# Patient Record
Sex: MALE | Race: OTHER | Employment: FULL TIME | URBAN - METROPOLITAN AREA
[De-identification: names, ages, dates, MRNs, and addresses within clinical notes are randomized per-mention and may not be internally consistent; named-entity substitution may affect disease eponyms.]

---

## 2019-07-05 ENCOUNTER — HOSPITAL ENCOUNTER (OUTPATIENT)
Age: 64
Discharge: HOME | End: 2019-07-05
Payer: COMMERCIAL

## 2019-07-05 VITALS
SYSTOLIC BLOOD PRESSURE: 139 MMHG | TEMPERATURE: 99 F | WEIGHT: 190 LBS | HEART RATE: 58 BPM | BODY MASS INDEX: 26 KG/M2 | DIASTOLIC BLOOD PRESSURE: 54 MMHG | RESPIRATION RATE: 18 BRPM | OXYGEN SATURATION: 98 %

## 2019-07-05 DIAGNOSIS — L24.89 IRRITANT CONTACT DERMATITIS DUE TO OTHER AGENTS: Primary | ICD-10-CM

## 2019-07-05 PROCEDURE — 99203 OFFICE O/P NEW LOW 30 MIN: CPT

## 2019-07-05 NOTE — ED PROVIDER NOTES
Patient presents with:  Rash Skin Problem (integumentary)      HPI:     Anshu Asher is a 58year old male who presents today with a chief complaint of rash to the anterior proximal upper arms past few days.   The patient works at a country [employer] and is file        Attends meetings of clubs or organizations: Not on file        Relationship status: Not on file      Intimate partner violence:        Fear of current or ex partner: Not on file        Emotionally abused: Not on file        Physically abused: N 30 g          Refill:  0      Labs performed this visit:  No results found for this or any previous visit (from the past 10 hour(s)). MDM:  The rash appears to be a contact dermatitis.   I will recommend taking an over-the-counter Benadryl or allergy med

## 2019-07-05 NOTE — ED INITIAL ASSESSMENT (HPI)
PATIENT ARRIVED AMBULATORY TO ROOM WITH FAMILY. +RASH TO BILATERAL UPPER ARMS. RASH WORSENS WITH SWEATING. +ITCHINESS.  NO NEW SOAPS, LOTIONS, DETERGENTS, ETC.

## 2021-03-12 DIAGNOSIS — Z23 NEED FOR VACCINATION: ICD-10-CM

## 2021-06-02 ENCOUNTER — HOSPITAL ENCOUNTER (OUTPATIENT)
Dept: GENERAL RADIOLOGY | Facility: HOSPITAL | Age: 66
Discharge: HOME | End: 2021-06-02
Attending: INTERNAL MEDICINE
Payer: COMMERCIAL

## 2021-06-02 ENCOUNTER — OFFICE VISIT (OUTPATIENT)
Dept: FAMILY MEDICINE CLINIC | Facility: CLINIC | Age: 66
End: 2021-06-02
Payer: COMMERCIAL

## 2021-06-02 VITALS
WEIGHT: 192 LBS | HEIGHT: 65 IN | DIASTOLIC BLOOD PRESSURE: 64 MMHG | BODY MASS INDEX: 31.99 KG/M2 | HEART RATE: 70 BPM | SYSTOLIC BLOOD PRESSURE: 150 MMHG | OXYGEN SATURATION: 98 %

## 2021-06-02 DIAGNOSIS — Z76.89 ESTABLISHING CARE WITH NEW DOCTOR, ENCOUNTER FOR: ICD-10-CM

## 2021-06-02 DIAGNOSIS — Z76.89 ESTABLISHING CARE WITH NEW DOCTOR, ENCOUNTER FOR: Primary | ICD-10-CM

## 2021-06-02 DIAGNOSIS — M17.0 PRIMARY OSTEOARTHRITIS OF BOTH KNEES: ICD-10-CM

## 2021-06-02 DIAGNOSIS — Z11.59 NEED FOR HEPATITIS C SCREENING TEST: ICD-10-CM

## 2021-06-02 PROCEDURE — 3008F BODY MASS INDEX DOCD: CPT | Performed by: INTERNAL MEDICINE

## 2021-06-02 PROCEDURE — 73564 X-RAY EXAM KNEE 4 OR MORE: CPT | Performed by: INTERNAL MEDICINE

## 2021-06-02 PROCEDURE — 3077F SYST BP >= 140 MM HG: CPT | Performed by: INTERNAL MEDICINE

## 2021-06-02 PROCEDURE — 99204 OFFICE O/P NEW MOD 45 MIN: CPT | Performed by: INTERNAL MEDICINE

## 2021-06-02 PROCEDURE — 3078F DIAST BP <80 MM HG: CPT | Performed by: INTERNAL MEDICINE

## 2021-06-02 RX ORDER — NAPROXEN 500 MG/1
500 TABLET ORAL 2 TIMES DAILY WITH MEALS
Qty: 60 TABLET | Refills: 0 | Status: SHIPPED | OUTPATIENT
Start: 2021-06-02 | End: 2021-08-24

## 2021-06-03 NOTE — PROGRESS NOTES
[facility]  New Patient History and Physical      HPI:   Patient presents with:  Establish Care  Knee Pain: bilateral      Shahid Delgadillo is a 76year old male presenting for:  Establishment of care. Oneal Claude   Has  has a past medical hist hypertension          PSH:  History reviewed. No pertinent surgical history.     Allergies:  No Known Allergies   Social History:  Social History    Tobacco Use      Smoking status: Former Smoker        Quit date: 1994        Years since quittin.4 Ht 5' 5\" (1.651 m)   Wt 192 lb (87.1 kg)   SpO2 98%   BMI 31.95 kg/m²  Estimated body mass index is 31.95 kg/m² as calculated from the following:    Height as of this encounter: 5' 5\" (1.651 m). Weight as of this encounter: 192 lb (87.1 kg).    Wt Read presents primarily presents for:    (Z76.89) Establishing care with new doctor, encounter for  (primary encounter diagnosis)  Plan: LIPID PANEL, CBC, PLATELET; NO DIFFERENTIAL,         COMP METABOLIC PANEL (14), PSA, TOTAL WITH         REFLEX TO PSA, FREE, includes: Preparation to see patient including chart review, reviewing appropriate medical history, counseling and education (diet and exercise), discussing treatment options, ordering appropriate diagnostic tests and documentation.       Dominik Guadarrama MD

## 2021-06-08 ENCOUNTER — TELEPHONE (OUTPATIENT)
Dept: FAMILY MEDICINE CLINIC | Facility: CLINIC | Age: 66
End: 2021-06-08

## 2021-06-08 NOTE — TELEPHONE ENCOUNTER
TCB      I am unable to obtain referral on this member as their pcp states the following: Change of PCP to [facility] will need to be changed in order to process.   Pleas have the patient call their plan to change and we can move forward.      ----- Message

## 2021-06-11 ENCOUNTER — NURSE ONLY (OUTPATIENT)
Dept: FAMILY MEDICINE CLINIC | Facility: CLINIC | Age: 66
End: 2021-06-11
Payer: COMMERCIAL

## 2021-06-16 ENCOUNTER — TELEPHONE (OUTPATIENT)
Dept: FAMILY MEDICINE CLINIC | Facility: CLINIC | Age: 66
End: 2021-06-16

## 2021-06-16 DIAGNOSIS — Z00.00 PREVENTATIVE HEALTH CARE: Primary | ICD-10-CM

## 2021-06-16 NOTE — TELEPHONE ENCOUNTER
I attempted to call patient with no answer. Please inform him that all his labs returned. No sign abnormality. Lipid panel overall well controlled. Will plan for repeat labs in 6 months.       Orders placed      Jono Cruz MD

## 2021-06-21 ENCOUNTER — TELEPHONE (OUTPATIENT)
Dept: FAMILY MEDICINE CLINIC | Facility: CLINIC | Age: 66
End: 2021-06-21

## 2021-06-21 NOTE — TELEPHONE ENCOUNTER
Wife is calling for patients XR results. States Dr. Brittany Caba called her  on Saturday but he wasn't able to call back.

## 2021-06-22 NOTE — TELEPHONE ENCOUNTER
Results were discussed with his wife and verbalized understanding, she states his insurance will be changing and no need to get a referral for now to see Dr Jordan Gant, they will call once his insurance becomes active.

## 2021-07-26 ENCOUNTER — TELEPHONE (OUTPATIENT)
Dept: FAMILY MEDICINE CLINIC | Facility: CLINIC | Age: 66
End: 2021-07-26

## 2021-07-26 DIAGNOSIS — M17.0 PRIMARY OSTEOARTHRITIS OF BOTH KNEES: Primary | ICD-10-CM

## 2021-07-26 NOTE — TELEPHONE ENCOUNTER
Per Dr Nikita Aguilera he did not remember calling patient foe any kind of results, I asked patient's wife about the message and claims he called them to discuss xray results, per our records last time MD called him was last month and results were already discuss

## 2021-07-26 NOTE — TELEPHONE ENCOUNTER
pts wife called stating that pt received a call on Friday by Dr Marcos Balderrama but since he was working he was unable to   Wife requested for doctor to call her instead

## 2021-08-24 ENCOUNTER — HOSPITAL ENCOUNTER (OUTPATIENT)
Dept: GENERAL RADIOLOGY | Age: 66
Discharge: HOME | End: 2021-08-24
Attending: PHYSICAL MEDICINE & REHABILITATION
Payer: COMMERCIAL

## 2021-08-24 ENCOUNTER — OFFICE VISIT (OUTPATIENT)
Dept: NEUROLOGY | Facility: CLINIC | Age: 66
End: 2021-08-24

## 2021-08-24 VITALS
BODY MASS INDEX: 32.46 KG/M2 | OXYGEN SATURATION: 97 % | HEART RATE: 61 BPM | WEIGHT: 194.81 LBS | HEIGHT: 65 IN | DIASTOLIC BLOOD PRESSURE: 76 MMHG | SYSTOLIC BLOOD PRESSURE: 148 MMHG

## 2021-08-24 DIAGNOSIS — R29.898 WEAKNESS OF FOOT, UNSPECIFIED LATERALITY: ICD-10-CM

## 2021-08-24 DIAGNOSIS — M48.061 LUMBAR FORAMINAL STENOSIS: ICD-10-CM

## 2021-08-24 DIAGNOSIS — M79.604 BILATERAL LEG PAIN: Primary | ICD-10-CM

## 2021-08-24 DIAGNOSIS — M54.16 LUMBAR RADICULOPATHY: ICD-10-CM

## 2021-08-24 DIAGNOSIS — M79.604 BILATERAL LEG PAIN: ICD-10-CM

## 2021-08-24 DIAGNOSIS — M79.10 MYALGIA: ICD-10-CM

## 2021-08-24 DIAGNOSIS — M79.605 BILATERAL LEG PAIN: ICD-10-CM

## 2021-08-24 DIAGNOSIS — M79.605 BILATERAL LEG PAIN: Primary | ICD-10-CM

## 2021-08-24 PROCEDURE — 72110 X-RAY EXAM L-2 SPINE 4/>VWS: CPT | Performed by: PHYSICAL MEDICINE & REHABILITATION

## 2021-08-24 PROCEDURE — 99244 OFF/OP CNSLTJ NEW/EST MOD 40: CPT | Performed by: PHYSICAL MEDICINE & REHABILITATION

## 2021-08-24 PROCEDURE — 3077F SYST BP >= 140 MM HG: CPT | Performed by: PHYSICAL MEDICINE & REHABILITATION

## 2021-08-24 PROCEDURE — 3078F DIAST BP <80 MM HG: CPT | Performed by: PHYSICAL MEDICINE & REHABILITATION

## 2021-08-24 PROCEDURE — 3008F BODY MASS INDEX DOCD: CPT | Performed by: PHYSICAL MEDICINE & REHABILITATION

## 2021-08-24 RX ORDER — GABAPENTIN 100 MG/1
100 CAPSULE ORAL 3 TIMES DAILY
Qty: 90 CAPSULE | Refills: 0 | Status: SHIPPED | OUTPATIENT
Start: 2021-08-24 | End: 2021-09-24

## 2021-08-24 NOTE — PATIENT INSTRUCTIONS
1) Get Xr of the lumbar spine today on your way out  2) Begin formal physical therapy at the [facility]  3) Start gabapentin 100 mg three times per day. If limited improvement, then would increase to 200 mg three times per day.   4) Follow up with me

## 2021-08-24 NOTE — H&P
[address] H&P    Requesting Physician: Barbara Mora MD    Chief Complaint (Reason for Visit):  Patient presents with:  New Patient: Bilateral leg pain. left worse than right.  Pain starts from t Diagnosis Date   • Unspecified essential hypertension        PAST SURGICAL HISTORY:   History reviewed. No pertinent surgical history.      FAMILY HISTORY:   Family History   Problem Relation Age of Onset   • No Known Problems Daughter    • No Known Probl oriented x 3, attentive, able to follow 2 step commands, comprehention intact, spontaneous speech intact  Motor:    Musculoskeletal:    LUMBAR SPINE:  Inspection: no erythema, swelling, or obvious deformity.   Their iliac crest and shoulder heights are symm 06/11/2021 25  7 - 25 mg/dL Final   • CREATININE 06/11/2021 1.03  0.70 - 1.25 mg/dL Final   • eGFR NON-AFR.  AMERICAN 06/11/2021 75  > OR = 60 mL/min/1.73m2 Final   • eGFR  06/11/2021 87  > OR = 60 mL/min/1.73m2 Final   • BUN/CREATININE RATI OTHER: Negative. Impression: CONCLUSION:   1. 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            Dictated by (CST): Stevenson Addison MD on 6/02/2021 at 6:04 PM       Finalized b symptoms increase more so when he has a couple of cocktails over the weekend. On the differential is alcohol induced neuropathy although less likely as Jesus does not consume much alcohol.        RTC in 6 to 8 weeks    Discharge Instructions were provid

## 2021-08-27 ENCOUNTER — TELEPHONE (OUTPATIENT)
Dept: NEUROLOGY | Facility: CLINIC | Age: 66
End: 2021-08-27

## 2021-08-27 NOTE — TELEPHONE ENCOUNTER
. Gayla Petty called to receive results of  Giuseppe Romano. ARABELLA verified. I discussed the X-ray results and recommended to continue plan of care including gabapentin, physical therapy, and follow up in 6 weeks. Guy Cha.  Jordan Gant MD, [facility] [place]

## 2021-09-10 ENCOUNTER — TELEPHONE (OUTPATIENT)
Dept: PHYSICAL THERAPY | Facility: HOSPITAL | Age: 66
End: 2021-09-10

## 2021-09-14 ENCOUNTER — OFFICE VISIT (OUTPATIENT)
Dept: PHYSICAL THERAPY | Facility: HOSPITAL | Age: 66
End: 2021-09-14
Attending: PHYSICAL MEDICINE & REHABILITATION
Payer: COMMERCIAL

## 2021-09-14 DIAGNOSIS — M79.10 MYALGIA: ICD-10-CM

## 2021-09-14 DIAGNOSIS — M54.16 LUMBAR RADICULOPATHY: ICD-10-CM

## 2021-09-14 DIAGNOSIS — M48.061 LUMBAR FORAMINAL STENOSIS: ICD-10-CM

## 2021-09-14 DIAGNOSIS — M79.605 BILATERAL LEG PAIN: ICD-10-CM

## 2021-09-14 DIAGNOSIS — M79.604 BILATERAL LEG PAIN: ICD-10-CM

## 2021-09-14 DIAGNOSIS — R29.898 WEAKNESS OF FOOT, UNSPECIFIED LATERALITY: ICD-10-CM

## 2021-09-14 PROCEDURE — 97161 PT EVAL LOW COMPLEX 20 MIN: CPT | Performed by: PHYSICAL THERAPIST

## 2021-09-14 PROCEDURE — 97110 THERAPEUTIC EXERCISES: CPT | Performed by: PHYSICAL THERAPIST

## 2021-09-14 NOTE — PROGRESS NOTES
9/16/2021  Dx:     Bilateral leg pain (M79.604,M79.605)  Lumbar radiculopathy (M54.16)  Lumbar foraminal stenosis (M48.061)  Myalgia (M79.10)  Weakness of foot, unspecified laterality (O16.158)        Authorized # of Visits:  8 visits on HMO         Next M without an increase in pain.     Frequency / Duration: Patient will be seen for 1-2 x/week or a total of 8 visits over a 90 day period. Treatment will include: Manual Therapy; Therapeutic Exercises; Neuromuscular Re-education; Therapeutic Activity;   Patie

## 2021-09-16 ENCOUNTER — OFFICE VISIT (OUTPATIENT)
Dept: PHYSICAL THERAPY | Facility: HOSPITAL | Age: 66
End: 2021-09-16
Attending: PHYSICAL MEDICINE & REHABILITATION
Payer: COMMERCIAL

## 2021-09-16 DIAGNOSIS — M79.10 MYALGIA: ICD-10-CM

## 2021-09-16 DIAGNOSIS — M48.061 LUMBAR FORAMINAL STENOSIS: ICD-10-CM

## 2021-09-16 DIAGNOSIS — R29.898 WEAKNESS OF FOOT, UNSPECIFIED LATERALITY: ICD-10-CM

## 2021-09-16 DIAGNOSIS — M79.605 BILATERAL LEG PAIN: ICD-10-CM

## 2021-09-16 DIAGNOSIS — M79.604 BILATERAL LEG PAIN: ICD-10-CM

## 2021-09-16 DIAGNOSIS — M54.16 LUMBAR RADICULOPATHY: ICD-10-CM

## 2021-09-16 PROCEDURE — 97110 THERAPEUTIC EXERCISES: CPT | Performed by: PHYSICAL THERAPIST

## 2021-09-16 NOTE — PROGRESS NOTES
LUMBAR SPINE EVALUATION:   Referring Physician: Dr. Ebony Alexander  Diagnosis:    M79.604, M79.605 (ICD-10-CM) - 729.5 (ICD-9-CM) - Bilateral leg pain    M54.16 (ICD-10-CM) - 724.4 (ICD-9-CM) - Lumbar radiculopathy    M48.061 (ICD-10-CM) - 724.02 (ICD-9-CM) - Fallon Arciniega most pain is in his calf and does report some weakness with heel walking and toe walking.   He also displays decreased strength with MMT's  He displays very limited flexibility with throughout his trunk with poor ability to exhale with decreased IO/TA recru the plan of care, purpose and individual goals for therapy, precautions for therapy. All questions were answered. Charges: PT Eval x 1, TE2    Total Timed treatment: 45 min      Total Treatment Time: 45 min         PLAN OF CARE:    Goals:       The pt

## 2021-09-23 DIAGNOSIS — R29.898 WEAKNESS OF FOOT, UNSPECIFIED LATERALITY: ICD-10-CM

## 2021-09-23 DIAGNOSIS — M79.605 BILATERAL LEG PAIN: ICD-10-CM

## 2021-09-23 DIAGNOSIS — M79.10 MYALGIA: ICD-10-CM

## 2021-09-23 DIAGNOSIS — M79.604 BILATERAL LEG PAIN: ICD-10-CM

## 2021-09-23 DIAGNOSIS — M54.16 LUMBAR RADICULOPATHY: ICD-10-CM

## 2021-09-23 DIAGNOSIS — M48.061 LUMBAR FORAMINAL STENOSIS: ICD-10-CM

## 2021-09-24 RX ORDER — GABAPENTIN 100 MG/1
CAPSULE ORAL
Qty: 90 CAPSULE | Refills: 0 | Status: SHIPPED | OUTPATIENT
Start: 2021-09-24

## 2021-09-24 NOTE — TELEPHONE ENCOUNTER
Medication request: gabapentin 100 MG Oral Cap  Sig:   Take 1 capsule (100 mg total) by mouth 3 (three) times daily. LOV: 8/24/21  NOV: 10/26/21      [place] 8/24/21 qty#90 r-0    Patient states he's feeling great on rx.   Patient states 0/10

## 2021-09-28 ENCOUNTER — OFFICE VISIT (OUTPATIENT)
Dept: PHYSICAL THERAPY | Facility: HOSPITAL | Age: 66
End: 2021-09-28
Attending: PHYSICAL MEDICINE & REHABILITATION
Payer: COMMERCIAL

## 2021-09-28 PROCEDURE — 97110 THERAPEUTIC EXERCISES: CPT | Performed by: PHYSICAL THERAPIST

## 2021-09-28 NOTE — PROGRESS NOTES
9/28/2021  Dx:     Bilateral leg pain (M79.604,M79.605)  Lumbar radiculopathy (M54.16)  Lumbar foraminal stenosis (M48.061)  Myalgia (M79.10)  Weakness of foot, unspecified laterality (L98.554)        Authorized # of Visits:  8 visits on HMO         Next M will be able to perform sit to stand without an increase in pain. 5.  The pt will be able to perform a full work day without an increase in pain.     Frequency / Duration: Patient will be seen for 1-2 x/week or a total of 8 visits over a 90 day period.   Joaquim Badillo

## 2021-10-01 ENCOUNTER — TELEPHONE (OUTPATIENT)
Dept: PHYSICAL THERAPY | Facility: HOSPITAL | Age: 66
End: 2021-10-01

## 2021-10-01 ENCOUNTER — APPOINTMENT (OUTPATIENT)
Dept: PHYSICAL THERAPY | Facility: HOSPITAL | Age: 66
End: 2021-10-01
Attending: PHYSICAL MEDICINE & REHABILITATION
Payer: COMMERCIAL

## 2021-10-05 ENCOUNTER — APPOINTMENT (OUTPATIENT)
Dept: PHYSICAL THERAPY | Facility: HOSPITAL | Age: 66
End: 2021-10-05
Attending: PHYSICAL MEDICINE & REHABILITATION
Payer: COMMERCIAL

## 2021-10-14 ENCOUNTER — TELEPHONE (OUTPATIENT)
Dept: PHYSICAL THERAPY | Facility: HOSPITAL | Age: 66
End: 2021-10-14

## 2021-10-14 NOTE — TELEPHONE ENCOUNTER
Left message offering the 3:45 pm PT appointment for today.   Given scheduling line to return the call

## 2021-10-14 NOTE — TELEPHONE ENCOUNTER
Spoke with the patient's wife who stated he can't come to therapy this week because he is working late. States he does not work in the winter and will have more availability.   The wife stated the patient was doing his HEP daily when he gets home from work

## 2021-11-17 DIAGNOSIS — Z12.11 ENCOUNTER FOR FIT (FECAL IMMUNOCHEMICAL TEST) SCREENING: ICD-10-CM

## 2021-11-17 DIAGNOSIS — Z12.11 ENCOUNTER FOR SCREENING COLONOSCOPY: Primary | ICD-10-CM

## 2021-11-17 NOTE — PROGRESS NOTES
Please call to remind of healthcare maintenance. A colonoscopy is a simple and effective way to screen for colon cancer and remove suspicious polyps that can become cancerous. This helps to prevent the development of colorectal cancer.   If present, d

## 2022-01-01 ENCOUNTER — APPOINTMENT (OUTPATIENT)
Dept: MRI IMAGING | Facility: HOSPITAL | Age: 67
End: 2022-01-01
Attending: Other
Payer: COMMERCIAL

## 2022-01-01 ENCOUNTER — APPOINTMENT (OUTPATIENT)
Dept: GENERAL RADIOLOGY | Facility: HOSPITAL | Age: 67
End: 2022-01-01
Payer: COMMERCIAL

## 2022-01-01 ENCOUNTER — APPOINTMENT (OUTPATIENT)
Dept: GENERAL RADIOLOGY | Facility: HOSPITAL | Age: 67
End: 2022-01-01
Attending: HOSPITALIST
Payer: COMMERCIAL

## 2022-01-01 ENCOUNTER — APPOINTMENT (OUTPATIENT)
Dept: INTERVENTIONAL RADIOLOGY/VASCULAR | Facility: HOSPITAL | Age: 67
End: 2022-01-01
Attending: RADIOLOGY
Payer: COMMERCIAL

## 2022-01-01 ENCOUNTER — HOSPITAL ENCOUNTER (INPATIENT)
Facility: HOSPITAL | Age: 67
LOS: 3 days | End: 2022-01-01
Attending: INTERNAL MEDICINE | Admitting: HOSPITALIST
Payer: COMMERCIAL

## 2022-01-01 ENCOUNTER — APPOINTMENT (OUTPATIENT)
Dept: CT IMAGING | Facility: HOSPITAL | Age: 67
End: 2022-01-01
Attending: Other
Payer: COMMERCIAL

## 2022-01-01 ENCOUNTER — APPOINTMENT (OUTPATIENT)
Dept: CV DIAGNOSTICS | Facility: HOSPITAL | Age: 67
End: 2022-01-01
Attending: Other
Payer: COMMERCIAL

## 2022-01-01 ENCOUNTER — APPOINTMENT (OUTPATIENT)
Dept: CT IMAGING | Facility: HOSPITAL | Age: 67
End: 2022-01-01
Attending: INTERNAL MEDICINE
Payer: COMMERCIAL

## 2022-01-01 ENCOUNTER — APPOINTMENT (OUTPATIENT)
Dept: CT IMAGING | Facility: HOSPITAL | Age: 67
End: 2022-01-01
Attending: EMERGENCY MEDICINE
Payer: COMMERCIAL

## 2022-01-01 ENCOUNTER — APPOINTMENT (OUTPATIENT)
Dept: CV DIAGNOSTICS | Facility: HOSPITAL | Age: 67
End: 2022-01-01
Attending: INTERNAL MEDICINE
Payer: COMMERCIAL

## 2022-01-01 ENCOUNTER — APPOINTMENT (OUTPATIENT)
Dept: CT IMAGING | Facility: HOSPITAL | Age: 67
End: 2022-01-01
Payer: COMMERCIAL

## 2022-01-01 ENCOUNTER — HOSPITAL ENCOUNTER (INPATIENT)
Facility: HOSPITAL | Age: 67
LOS: 1 days | Discharge: ANOTHER HOSPITAL | End: 2022-01-01
Attending: EMERGENCY MEDICINE | Admitting: HOSPITALIST
Payer: COMMERCIAL

## 2022-01-01 VITALS
WEIGHT: 150 LBS | SYSTOLIC BLOOD PRESSURE: 174 MMHG | BODY MASS INDEX: 27.6 KG/M2 | DIASTOLIC BLOOD PRESSURE: 67 MMHG | RESPIRATION RATE: 28 BRPM | TEMPERATURE: 99 F | OXYGEN SATURATION: 100 % | HEART RATE: 89 BPM | HEIGHT: 62 IN

## 2022-01-01 VITALS
WEIGHT: 187.19 LBS | RESPIRATION RATE: 16 BRPM | OXYGEN SATURATION: 100 % | BODY MASS INDEX: 34 KG/M2 | TEMPERATURE: 98 F | HEART RATE: 70 BPM | DIASTOLIC BLOOD PRESSURE: 68 MMHG | SYSTOLIC BLOOD PRESSURE: 142 MMHG

## 2022-01-01 DIAGNOSIS — R29.90 NEUROLOGICAL DYSFUNCTION: ICD-10-CM

## 2022-01-01 DIAGNOSIS — I63.9 CEREBROVASCULAR ACCIDENT (CVA), UNSPECIFIED MECHANISM (HCC): Primary | ICD-10-CM

## 2022-01-01 LAB
ALBUMIN SERPL-MCNC: 2.2 G/DL (ref 3.4–5)
ALBUMIN SERPL-MCNC: 2.2 G/DL (ref 3.4–5)
ALBUMIN SERPL-MCNC: 2.4 G/DL (ref 3.4–5)
ALBUMIN SERPL-MCNC: 2.4 G/DL (ref 3.4–5)
ALBUMIN SERPL-MCNC: 2.5 G/DL (ref 3.4–5)
ALBUMIN/GLOB SERPL: 0.5 {RATIO} (ref 1–2)
ALBUMIN/GLOB SERPL: 0.6 {RATIO} (ref 1–2)
ALP LIVER SERPL-CCNC: 58 U/L
ALP LIVER SERPL-CCNC: 65 U/L
ALP LIVER SERPL-CCNC: 69 U/L
ALP LIVER SERPL-CCNC: 71 U/L
ALP LIVER SERPL-CCNC: 78 U/L
ALT SERPL-CCNC: 25 U/L
ALT SERPL-CCNC: 26 U/L
ALT SERPL-CCNC: 26 U/L
ALT SERPL-CCNC: 30 U/L
ALT SERPL-CCNC: 32 U/L
AMYLASE SERPL-CCNC: 110 U/L (ref 25–115)
AMYLASE SERPL-CCNC: 119 U/L (ref 25–115)
AMYLASE SERPL-CCNC: 137 U/L (ref 25–115)
AMYLASE SERPL-CCNC: 169 U/L (ref 25–115)
AMYLASE SERPL-CCNC: 182 U/L (ref 25–115)
ANION GAP SERPL CALC-SCNC: 2 MMOL/L (ref 0–18)
ANION GAP SERPL CALC-SCNC: 3 MMOL/L (ref 0–18)
ANION GAP SERPL CALC-SCNC: 3 MMOL/L (ref 0–18)
ANION GAP SERPL CALC-SCNC: 4 MMOL/L (ref 0–18)
ANION GAP SERPL CALC-SCNC: 4 MMOL/L (ref 0–18)
ANION GAP SERPL CALC-SCNC: 5 MMOL/L (ref 0–18)
ANION GAP SERPL CALC-SCNC: 6 MMOL/L (ref 0–18)
ANION GAP SERPL CALC-SCNC: 7 MMOL/L (ref 0–18)
ANTIBODY SCREEN: NEGATIVE
ANTIBODY SCREEN: NEGATIVE
APTT PPP: 25.7 SECONDS (ref 23.3–35.6)
APTT PPP: 26.8 SECONDS (ref 23.3–35.6)
APTT PPP: 27 SECONDS (ref 23.3–35.6)
APTT PPP: 27.5 SECONDS (ref 23.3–35.6)
APTT PPP: 30 SECONDS (ref 23.3–35.6)
AST SERPL-CCNC: 19 U/L (ref 15–37)
AST SERPL-CCNC: 22 U/L (ref 15–37)
AST SERPL-CCNC: 24 U/L (ref 15–37)
AST SERPL-CCNC: 28 U/L (ref 15–37)
AST SERPL-CCNC: 28 U/L (ref 15–37)
ATRIAL RATE: 56 BPM
BASE EXCESS BLD CALC-SCNC: -1.5 MMOL/L (ref ?–2)
BASE EXCESS BLDA CALC-SCNC: -0.8 MMOL/L (ref ?–2)
BASE EXCESS BLDA CALC-SCNC: -0.8 MMOL/L (ref ?–2)
BASE EXCESS BLDA CALC-SCNC: -2.6 MMOL/L (ref ?–2)
BASE EXCESS BLDA CALC-SCNC: -2.7 MMOL/L (ref ?–2)
BASE EXCESS BLDA CALC-SCNC: -3.4 MMOL/L (ref ?–2)
BASE EXCESS BLDA CALC-SCNC: -3.5 MMOL/L (ref ?–2)
BASE EXCESS BLDA CALC-SCNC: -3.5 MMOL/L (ref ?–2)
BASE EXCESS BLDA CALC-SCNC: -3.9 MMOL/L (ref ?–2)
BASE EXCESS BLDA CALC-SCNC: -4 MMOL/L (ref ?–2)
BASE EXCESS BLDA CALC-SCNC: -4.5 MMOL/L (ref ?–2)
BASE EXCESS BLDA CALC-SCNC: -5.6 MMOL/L (ref ?–2)
BASOPHILS # BLD AUTO: 0 X10(3) UL (ref 0–0.2)
BASOPHILS # BLD AUTO: 0 X10(3) UL (ref 0–0.2)
BASOPHILS # BLD AUTO: 0.01 X10(3) UL (ref 0–0.2)
BASOPHILS # BLD AUTO: 0.01 X10(3) UL (ref 0–0.2)
BASOPHILS # BLD AUTO: 0.02 X10(3) UL (ref 0–0.2)
BASOPHILS # BLD AUTO: 0.02 X10(3) UL (ref 0–0.2)
BASOPHILS # BLD AUTO: 0.03 X10(3) UL (ref 0–0.2)
BASOPHILS NFR BLD AUTO: 0 %
BASOPHILS NFR BLD AUTO: 0 %
BASOPHILS NFR BLD AUTO: 0.1 %
BASOPHILS NFR BLD AUTO: 0.1 %
BASOPHILS NFR BLD AUTO: 0.2 %
BILIRUB DIRECT SERPL-MCNC: 0.3 MG/DL (ref 0–0.2)
BILIRUB DIRECT SERPL-MCNC: 0.4 MG/DL (ref 0–0.2)
BILIRUB SERPL-MCNC: 0.8 MG/DL (ref 0.1–2)
BILIRUB SERPL-MCNC: 1 MG/DL (ref 0.1–2)
BILIRUB UR QL STRIP.AUTO: NEGATIVE
BLOOD TYPE BARCODE: 7300
BLOOD TYPE BARCODE: 7300
BODY TEMPERATURE: 98.6 F
BUN BLD-MCNC: 15 MG/DL (ref 7–18)
BUN BLD-MCNC: 20 MG/DL (ref 7–18)
BUN BLD-MCNC: 20 MG/DL (ref 7–18)
BUN BLD-MCNC: 22 MG/DL (ref 7–18)
BUN BLD-MCNC: 22 MG/DL (ref 7–18)
BUN BLD-MCNC: 24 MG/DL (ref 7–18)
BUN BLD-MCNC: 24 MG/DL (ref 7–18)
BUN BLD-MCNC: 25 MG/DL (ref 7–18)
BUN/CREAT SERPL: 22 (ref 10–20)
BUN/CREAT SERPL: 26.6 (ref 10–20)
CA-I BLD-SCNC: 1.02 MMOL/L (ref 0.95–1.32)
CA-I BLD-SCNC: 1.04 MMOL/L (ref 0.95–1.32)
CA-I BLD-SCNC: 1.15 MMOL/L (ref 0.95–1.32)
CA-I BLD-SCNC: 1.18 MMOL/L (ref 0.95–1.32)
CA-I BLD-SCNC: 1.2 MMOL/L (ref 0.95–1.32)
CA-I BLD-SCNC: 1.21 MMOL/L (ref 0.95–1.32)
CA-I BLD-SCNC: 1.22 MMOL/L (ref 0.95–1.32)
CA-I BLD-SCNC: 1.22 MMOL/L (ref 0.95–1.32)
CA-I BLD-SCNC: 1.24 MMOL/L (ref 0.95–1.32)
CA-I BLD-SCNC: 1.24 MMOL/L (ref 0.95–1.32)
CA-I BLD-SCNC: 1.25 MMOL/L (ref 0.95–1.32)
CALCIUM BLD-MCNC: 7.9 MG/DL (ref 8.5–10.1)
CALCIUM BLD-MCNC: 8.2 MG/DL (ref 8.5–10.1)
CALCIUM BLD-MCNC: 8.4 MG/DL (ref 8.5–10.1)
CALCIUM BLD-MCNC: 8.5 MG/DL (ref 8.5–10.1)
CALCIUM BLD-MCNC: 8.6 MG/DL (ref 8.5–10.1)
CALCIUM BLD-MCNC: 8.7 MG/DL (ref 8.5–10.1)
CALCIUM BLD-MCNC: 8.9 MG/DL (ref 8.5–10.1)
CALCIUM BLD-MCNC: 9.2 MG/DL (ref 8.5–10.1)
CHLORIDE SERPL-SCNC: 109 MMOL/L (ref 98–112)
CHLORIDE SERPL-SCNC: 109 MMOL/L (ref 98–112)
CHLORIDE SERPL-SCNC: 129 MMOL/L (ref 98–112)
CHLORIDE SERPL-SCNC: 131 MMOL/L (ref 98–112)
CHLORIDE SERPL-SCNC: 132 MMOL/L (ref 98–112)
CHLORIDE SERPL-SCNC: 132 MMOL/L (ref 98–112)
CHLORIDE SERPL-SCNC: 133 MMOL/L (ref 98–112)
CHLORIDE SERPL-SCNC: 133 MMOL/L (ref 98–112)
CHOLEST SERPL-MCNC: 138 MG/DL (ref ?–200)
CK SERPL-CCNC: 137 U/L
CK SERPL-CCNC: 190 U/L
CK SERPL-CCNC: 212 U/L
CK SERPL-CCNC: 229 U/L
CK SERPL-CCNC: 84 U/L
CO2 SERPL-SCNC: 22 MMOL/L (ref 21–32)
CO2 SERPL-SCNC: 23 MMOL/L (ref 21–32)
CO2 SERPL-SCNC: 24 MMOL/L (ref 21–32)
CO2 SERPL-SCNC: 26 MMOL/L (ref 21–32)
CO2 SERPL-SCNC: 26 MMOL/L (ref 21–32)
COHGB MFR BLD: 0 % SAT (ref 0–3)
COHGB MFR BLD: 0.3 % SAT (ref 0–3)
COHGB MFR BLD: 0.4 % SAT (ref 0–3)
COHGB MFR BLD: 0.5 % SAT (ref 0–3)
COHGB MFR BLD: 0.6 % SAT (ref 0–3)
COHGB MFR BLD: 0.8 % SAT (ref 0–3)
COHGB MFR BLD: 0.9 % SAT (ref 0–3)
COHGB MFR BLD: 1 % SAT (ref 0–3)
COLOR UR AUTO: YELLOW
CREAT BLD-MCNC: 0.94 MG/DL
CREAT BLD-MCNC: 1 MG/DL
CREAT BLD-MCNC: 1.09 MG/DL
CREAT BLD-MCNC: 1.31 MG/DL
CREAT BLD-MCNC: 1.32 MG/DL
CREAT BLD-MCNC: 1.39 MG/DL
CREAT BLD-MCNC: 1.44 MG/DL
CREAT BLD-MCNC: 1.54 MG/DL
D DIMER PPP FEU-MCNC: 10.54 UG/ML FEU (ref ?–0.67)
D DIMER PPP FEU-MCNC: 12.03 UG/ML FEU (ref ?–0.67)
D DIMER PPP FEU-MCNC: 13.5 UG/ML FEU (ref ?–0.67)
D DIMER PPP FEU-MCNC: 9.43 UG/ML FEU (ref ?–0.67)
D DIMER PPP FEU-MCNC: >20 UG/ML FEU (ref ?–0.67)
DEPRECATED RDW RBC AUTO: 44.6 FL (ref 35.1–46.3)
DEPRECATED RDW RBC AUTO: 46.9 FL (ref 35.1–46.3)
EOSINOPHIL # BLD AUTO: 0 X10(3) UL (ref 0–0.7)
EOSINOPHIL # BLD AUTO: 0.05 X10(3) UL (ref 0–0.7)
EOSINOPHIL NFR BLD AUTO: 0 %
EOSINOPHIL NFR BLD AUTO: 0.4 %
ERYTHROCYTE [DISTWIDTH] IN BLOOD BY AUTOMATED COUNT: 13.6 % (ref 11–15)
ERYTHROCYTE [DISTWIDTH] IN BLOOD BY AUTOMATED COUNT: 13.9 % (ref 11–15)
ERYTHROCYTE [DISTWIDTH] IN BLOOD BY AUTOMATED COUNT: 14.4 %
ERYTHROCYTE [DISTWIDTH] IN BLOOD BY AUTOMATED COUNT: 15.3 %
ERYTHROCYTE [DISTWIDTH] IN BLOOD BY AUTOMATED COUNT: 15.3 %
ERYTHROCYTE [DISTWIDTH] IN BLOOD BY AUTOMATED COUNT: 15.5 %
ERYTHROCYTE [DISTWIDTH] IN BLOOD BY AUTOMATED COUNT: 15.6 %
ERYTHROCYTE [DISTWIDTH] IN BLOOD BY AUTOMATED COUNT: 15.6 %
EST. AVERAGE GLUCOSE BLD GHB EST-MCNC: 131 MG/DL (ref 68–126)
FIBRINOGEN PPP-MCNC: 708 MG/DL (ref 180–480)
FIBRINOGEN PPP-MCNC: 710 MG/DL (ref 180–480)
FIBRINOGEN PPP-MCNC: 715 MG/DL (ref 180–480)
FIBRINOGEN PPP-MCNC: 855 MG/DL (ref 180–480)
FIBRINOGEN PPP-MCNC: 860 MG/DL (ref 180–480)
FIO2: 100 %
FIO2: 40 %
FIO2: 50 %
GGT SERPL-CCNC: 38 U/L
GGT SERPL-CCNC: 39 U/L
GGT SERPL-CCNC: 39 U/L
GGT SERPL-CCNC: 40 U/L
GGT SERPL-CCNC: 46 U/L
GLOBULIN PLAS-MCNC: 3.9 G/DL (ref 2.8–4.4)
GLOBULIN PLAS-MCNC: 4 G/DL (ref 2.8–4.4)
GLOBULIN PLAS-MCNC: 4.1 G/DL (ref 2.8–4.4)
GLUCOSE BLD-MCNC: 124 MG/DL (ref 70–99)
GLUCOSE BLD-MCNC: 130 MG/DL (ref 70–99)
GLUCOSE BLD-MCNC: 132 MG/DL (ref 70–99)
GLUCOSE BLD-MCNC: 132 MG/DL (ref 70–99)
GLUCOSE BLD-MCNC: 133 MG/DL (ref 70–99)
GLUCOSE BLD-MCNC: 134 MG/DL (ref 70–99)
GLUCOSE BLD-MCNC: 138 MG/DL (ref 70–99)
GLUCOSE BLD-MCNC: 150 MG/DL (ref 70–99)
GLUCOSE BLD-MCNC: 182 MG/DL (ref 70–99)
GLUCOSE BLD-MCNC: 184 MG/DL (ref 70–99)
GLUCOSE BLD-MCNC: 186 MG/DL (ref 70–99)
GLUCOSE BLD-MCNC: 216 MG/DL (ref 70–99)
GLUCOSE BLDC GLUCOMTR-MCNC: 147 MG/DL (ref 70–99)
GLUCOSE UR STRIP.AUTO-MCNC: 50 MG/DL
GLUCOSE UR STRIP.AUTO-MCNC: NEGATIVE MG/DL
GLUCOSE UR STRIP.AUTO-MCNC: NEGATIVE MG/DL
HBA1C MFR BLD: 6.2 % (ref ?–5.7)
HCO3 BLDA-SCNC: 20.6 MEQ/L (ref 21–27)
HCO3 BLDA-SCNC: 21.4 MEQ/L (ref 21–27)
HCO3 BLDA-SCNC: 21.8 MEQ/L (ref 21–27)
HCO3 BLDA-SCNC: 21.9 MEQ/L (ref 21–27)
HCO3 BLDA-SCNC: 22.2 MEQ/L (ref 21–27)
HCO3 BLDA-SCNC: 22.2 MEQ/L (ref 21–27)
HCO3 BLDA-SCNC: 22.3 MEQ/L (ref 21–27)
HCO3 BLDA-SCNC: 22.8 MEQ/L (ref 21–27)
HCO3 BLDA-SCNC: 22.9 MEQ/L (ref 21–27)
HCO3 BLDA-SCNC: 23.8 MEQ/L (ref 21–27)
HCO3 BLDA-SCNC: 24.3 MEQ/L (ref 21–27)
HCO3 BLDA-SCNC: 24.3 MEQ/L (ref 21–27)
HCT VFR BLD AUTO: 33.9 %
HCT VFR BLD AUTO: 37 %
HCT VFR BLD AUTO: 39.3 %
HCT VFR BLD AUTO: 40 %
HCT VFR BLD AUTO: 40.1 %
HCT VFR BLD AUTO: 40.7 %
HCT VFR BLD AUTO: 41.7 %
HCT VFR BLD AUTO: 42.9 %
HDLC SERPL-MCNC: 38 MG/DL (ref 40–59)
HGB BLD-MCNC: 10.3 G/DL
HGB BLD-MCNC: 11.2 G/DL
HGB BLD-MCNC: 12 G/DL
HGB BLD-MCNC: 12.1 G/DL
HGB BLD-MCNC: 12.3 G/DL
HGB BLD-MCNC: 12.6 G/DL
HGB BLD-MCNC: 12.8 G/DL
HGB BLD-MCNC: 12.9 G/DL
HGB BLD-MCNC: 12.9 G/DL
HGB BLD-MCNC: 13.1 G/DL
HGB BLD-MCNC: 13.1 G/DL
HGB BLD-MCNC: 13.4 G/DL
HGB BLD-MCNC: 13.4 G/DL
HGB BLD-MCNC: 13.5 G/DL
HGB BLD-MCNC: 13.6 G/DL
HGB BLD-MCNC: 13.6 G/DL
HGB BLD-MCNC: 13.7 G/DL
HGB BLD-MCNC: 13.8 G/DL
IMM GRANULOCYTES # BLD AUTO: 0.02 X10(3) UL (ref 0–1)
IMM GRANULOCYTES # BLD AUTO: 0.05 X10(3) UL (ref 0–1)
IMM GRANULOCYTES # BLD AUTO: 0.06 X10(3) UL (ref 0–1)
IMM GRANULOCYTES # BLD AUTO: 0.06 X10(3) UL (ref 0–1)
IMM GRANULOCYTES # BLD AUTO: 0.07 X10(3) UL (ref 0–1)
IMM GRANULOCYTES # BLD AUTO: 0.08 X10(3) UL (ref 0–1)
IMM GRANULOCYTES # BLD AUTO: 0.1 X10(3) UL (ref 0–1)
IMM GRANULOCYTES NFR BLD: 0.3 %
IMM GRANULOCYTES NFR BLD: 0.4 %
IMM GRANULOCYTES NFR BLD: 0.5 %
IMM GRANULOCYTES NFR BLD: 0.5 %
IMM GRANULOCYTES NFR BLD: 0.6 %
IMM GRANULOCYTES NFR BLD: 0.6 %
IMM GRANULOCYTES NFR BLD: 0.9 %
INR BLD: 1.35 (ref 0.8–1.2)
INR BLD: 1.36 (ref 0.8–1.2)
INR BLD: 1.46 (ref 0.8–1.2)
INR BLD: 1.49 (ref 0.8–1.2)
INR BLD: 1.49 (ref 0.8–1.2)
INR BLD: 1.53 (ref 0.8–1.2)
INSPIRATION SETTING TIME VENT: 1.25 %
INSPIRATION SETTING TIME VENT: 1.25 %
ISTAT ACTIVATED CLOTTING TIME: 118 SECONDS (ref 74–137)
KETONES UR STRIP.AUTO-MCNC: NEGATIVE MG/DL
L/M: 10 L/MIN
LACTATE BLD-SCNC: 0.5 MMOL/L (ref 0.5–2)
LACTATE BLD-SCNC: 0.6 MMOL/L (ref 0.5–2)
LACTATE BLD-SCNC: 0.7 MMOL/L (ref 0.5–2)
LACTATE BLD-SCNC: 0.8 MMOL/L (ref 0.5–2)
LACTATE BLD-SCNC: 0.9 MMOL/L (ref 0.5–2)
LACTATE BLD-SCNC: 1 MMOL/L (ref 0.5–2)
LACTATE BLD-SCNC: 1 MMOL/L (ref 0.5–2)
LACTATE BLD-SCNC: 1.1 MMOL/L (ref 0.5–2)
LACTATE BLD-SCNC: 1.3 MMOL/L (ref 0.5–2)
LACTATE BLD-SCNC: 1.9 MMOL/L (ref 0.5–2)
LACTATE BLD-SCNC: 2 MMOL/L (ref 0.5–2)
LDH SERPL L TO P-CCNC: 252 U/L
LDH SERPL L TO P-CCNC: 266 U/L
LDH SERPL L TO P-CCNC: 291 U/L
LDLC SERPL CALC-MCNC: 79 MG/DL (ref ?–100)
LEUKOCYTE ESTERASE UR QL STRIP.AUTO: NEGATIVE
LIPASE SERPL-CCNC: 110 U/L (ref 73–393)
LIPASE SERPL-CCNC: 113 U/L (ref 73–393)
LIPASE SERPL-CCNC: 157 U/L (ref 73–393)
LIPASE SERPL-CCNC: 83 U/L (ref 73–393)
LIPASE SERPL-CCNC: 91 U/L (ref 73–393)
LYMPHOCYTES # BLD AUTO: 0.33 X10(3) UL (ref 1–4)
LYMPHOCYTES # BLD AUTO: 0.45 X10(3) UL (ref 1–4)
LYMPHOCYTES # BLD AUTO: 0.5 X10(3) UL (ref 1–4)
LYMPHOCYTES # BLD AUTO: 0.8 X10(3) UL (ref 1–4)
LYMPHOCYTES # BLD AUTO: 0.94 X10(3) UL (ref 1–4)
LYMPHOCYTES NFR BLD AUTO: 2.3 %
LYMPHOCYTES NFR BLD AUTO: 3.5 %
LYMPHOCYTES NFR BLD AUTO: 3.9 %
LYMPHOCYTES NFR BLD AUTO: 4.2 %
LYMPHOCYTES NFR BLD AUTO: 6.1 %
LYMPHOCYTES NFR BLD AUTO: 6.9 %
LYMPHOCYTES NFR BLD AUTO: 8.9 %
MAGNESIUM SERPL-MCNC: 2.5 MG/DL (ref 1.6–2.6)
MAGNESIUM SERPL-MCNC: 2.7 MG/DL (ref 1.6–2.6)
MAGNESIUM SERPL-MCNC: 2.7 MG/DL (ref 1.6–2.6)
MAGNESIUM SERPL-MCNC: 2.8 MG/DL (ref 1.6–2.6)
MAGNESIUM SERPL-MCNC: 2.9 MG/DL (ref 1.6–2.6)
MCH RBC QN AUTO: 29.3 PG (ref 26–34)
MCH RBC QN AUTO: 29.4 PG (ref 26–34)
MCH RBC QN AUTO: 29.6 PG (ref 26–34)
MCH RBC QN AUTO: 29.7 PG (ref 26–34)
MCH RBC QN AUTO: 29.8 PG (ref 26–34)
MCH RBC QN AUTO: 29.9 PG (ref 26–34)
MCHC RBC AUTO-ENTMCNC: 30.2 G/DL (ref 31–37)
MCHC RBC AUTO-ENTMCNC: 30.3 G/DL (ref 31–37)
MCHC RBC AUTO-ENTMCNC: 30.4 G/DL (ref 31–37)
MCHC RBC AUTO-ENTMCNC: 30.9 G/DL (ref 31–37)
MCHC RBC AUTO-ENTMCNC: 31 G/DL (ref 31–37)
MCHC RBC AUTO-ENTMCNC: 32.2 G/DL (ref 31–37)
MCHC RBC AUTO-ENTMCNC: 32.8 G/DL (ref 31–37)
MCHC RBC AUTO-ENTMCNC: 32.8 G/DL (ref 31–37)
MCV RBC AUTO: 89.5 FL
MCV RBC AUTO: 89.9 FL
MCV RBC AUTO: 91.9 FL
MCV RBC AUTO: 95.9 FL
MCV RBC AUTO: 96.2 FL
MCV RBC AUTO: 96.3 FL
MCV RBC AUTO: 97.3 FL
MCV RBC AUTO: 98.7 FL
METHGB MFR BLD: 0.1 % SAT (ref 0.4–1.5)
METHGB MFR BLD: 0.3 % SAT (ref 0.4–1.5)
METHGB MFR BLD: 0.5 % SAT (ref 0.4–1.5)
METHGB MFR BLD: 0.5 % SAT (ref 0.4–1.5)
METHGB MFR BLD: 0.6 % SAT (ref 0.4–1.5)
METHGB MFR BLD: 0.7 % SAT (ref 0.4–1.5)
METHGB MFR BLD: 0.8 % SAT (ref 0.4–1.5)
MODIFIED ALLEN TEST: POSITIVE
MONOCYTES # BLD AUTO: 0.09 X10(3) UL (ref 0.1–1)
MONOCYTES # BLD AUTO: 0.23 X10(3) UL (ref 0.1–1)
MONOCYTES # BLD AUTO: 0.24 X10(3) UL (ref 0.1–1)
MONOCYTES # BLD AUTO: 0.25 X10(3) UL (ref 0.1–1)
MONOCYTES # BLD AUTO: 0.27 X10(3) UL (ref 0.1–1)
MONOCYTES # BLD AUTO: 0.64 X10(3) UL (ref 0.1–1)
MONOCYTES # BLD AUTO: 0.76 X10(3) UL (ref 0.1–1)
MONOCYTES NFR BLD AUTO: 1.4 %
MONOCYTES NFR BLD AUTO: 1.6 %
MONOCYTES NFR BLD AUTO: 1.8 %
MONOCYTES NFR BLD AUTO: 2.1 %
MONOCYTES NFR BLD AUTO: 2.5 %
MONOCYTES NFR BLD AUTO: 4.9 %
MONOCYTES NFR BLD AUTO: 7.2 %
NEUTROPHILS # BLD AUTO: 10.91 X10 (3) UL (ref 1.5–7.7)
NEUTROPHILS # BLD AUTO: 10.91 X10(3) UL (ref 1.5–7.7)
NEUTROPHILS # BLD AUTO: 11.53 X10 (3) UL (ref 1.5–7.7)
NEUTROPHILS # BLD AUTO: 11.53 X10(3) UL (ref 1.5–7.7)
NEUTROPHILS # BLD AUTO: 13.3 X10 (3) UL (ref 1.5–7.7)
NEUTROPHILS # BLD AUTO: 13.3 X10(3) UL (ref 1.5–7.7)
NEUTROPHILS # BLD AUTO: 13.46 X10 (3) UL (ref 1.5–7.7)
NEUTROPHILS # BLD AUTO: 13.46 X10(3) UL (ref 1.5–7.7)
NEUTROPHILS # BLD AUTO: 5.91 X10 (3) UL (ref 1.5–7.7)
NEUTROPHILS # BLD AUTO: 5.91 X10(3) UL (ref 1.5–7.7)
NEUTROPHILS # BLD AUTO: 8.77 X10 (3) UL (ref 1.5–7.7)
NEUTROPHILS # BLD AUTO: 8.77 X10(3) UL (ref 1.5–7.7)
NEUTROPHILS # BLD AUTO: 9.8 X10 (3) UL (ref 1.5–7.7)
NEUTROPHILS # BLD AUTO: 9.8 X10(3) UL (ref 1.5–7.7)
NEUTROPHILS NFR BLD AUTO: 83.1 %
NEUTROPHILS NFR BLD AUTO: 87.9 %
NEUTROPHILS NFR BLD AUTO: 91.2 %
NEUTROPHILS NFR BLD AUTO: 92.4 %
NEUTROPHILS NFR BLD AUTO: 93.6 %
NEUTROPHILS NFR BLD AUTO: 94.2 %
NEUTROPHILS NFR BLD AUTO: 95.3 %
NITRITE UR QL STRIP.AUTO: NEGATIVE
NONHDLC SERPL-MCNC: 100 MG/DL (ref ?–130)
O2 CT BLD-SCNC: 18.8 VOL% (ref 15–23)
O2/TOTAL GAS SETTING VFR VENT: 100 %
OSMOLALITY SERPL CALC.SUM OF ELEC: 295 MOSM/KG (ref 275–295)
OSMOLALITY SERPL CALC.SUM OF ELEC: 296 MOSM/KG (ref 275–295)
OSMOLALITY SERPL CALC.SUM OF ELEC: 330 MOSM/KG (ref 275–295)
OSMOLALITY SERPL CALC.SUM OF ELEC: 333 MOSM/KG (ref 275–295)
OSMOLALITY SERPL CALC.SUM OF ELEC: 334 MOSM/KG (ref 275–295)
OSMOLALITY SERPL CALC.SUM OF ELEC: 339 MOSM/KG (ref 275–295)
OSMOLALITY SERPL: 310 MOSM/KG (ref 280–300)
OSMOLALITY SERPL: 328 MOSM/KG (ref 280–300)
OSMOLALITY SERPL: 339 MOSM/KG (ref 280–300)
OXYHGB MFR BLDA: 96.6 % (ref 92–100)
OXYHGB MFR BLDA: 96.8 % (ref 92–100)
OXYHGB MFR BLDA: 97.4 % (ref 92–100)
OXYHGB MFR BLDA: 97.8 % (ref 92–100)
OXYHGB MFR BLDA: 97.8 % (ref 92–100)
OXYHGB MFR BLDA: 98 % (ref 92–100)
OXYHGB MFR BLDA: 98.1 % (ref 92–100)
OXYHGB MFR BLDA: 98.2 % (ref 92–100)
OXYHGB MFR BLDA: 98.3 % (ref 92–100)
OXYHGB MFR BLDA: 98.3 % (ref 92–100)
OXYHGB MFR BLDA: 98.6 % (ref 92–100)
P AXIS: 28 DEGREES
P-R INTERVAL: 144 MS
P/F RATIO: 182 MMHG
P/F RATIO: 208 MMHG
PCO2 BLDA: 27 MM HG (ref 35–45)
PCO2 BLDA: 37 MM HG (ref 35–45)
PCO2 BLDA: 40 MM HG (ref 35–45)
PCO2 BLDA: 41 MM HG (ref 35–45)
PCO2 BLDA: 43 MM HG (ref 35–45)
PCO2 BLDA: 46 MM HG (ref 35–45)
PCO2 BLDA: 47 MM HG (ref 35–45)
PCO2 BLDA: 51 MM HG (ref 35–45)
PCO2 BLDA: 60 MM HG (ref 35–45)
PCO2 BLDA: 62 MM HG (ref 35–45)
PCO2 BLDA: 70 MM HG (ref 35–45)
PCO2 BLDA: 81 MM HG (ref 35–45)
PEEP SETTING VENT: 10 CM H2O
PEEP: 15 CM H2O
PEEP: 2.5 CM H2O
PEEP: 2.5 CM H2O
PEEP: 5 CM H2O
PH BLDA: 7.11 [PH] (ref 7.35–7.45)
PH BLDA: 7.17 [PH] (ref 7.35–7.45)
PH BLDA: 7.21 [PH] (ref 7.35–7.45)
PH BLDA: 7.23 [PH] (ref 7.35–7.45)
PH BLDA: 7.29 [PH] (ref 7.35–7.45)
PH BLDA: 7.3 [PH] (ref 7.35–7.45)
PH BLDA: 7.32 [PH] (ref 7.35–7.45)
PH BLDA: 7.34 [PH] (ref 7.35–7.45)
PH BLDA: 7.34 [PH] (ref 7.35–7.45)
PH BLDA: 7.37 [PH] (ref 7.35–7.45)
PH BLDA: 7.37 [PH] (ref 7.35–7.45)
PH BLDA: 7.47 [PH] (ref 7.35–7.45)
PH BLDA: 7.5 [PH] (ref 7.35–7.45)
PH BLDA: 7.5 [PH] (ref 7.35–7.45)
PH UR STRIP.AUTO: 5 [PH] (ref 5–8)
PHOSPHATE SERPL-MCNC: 1.6 MG/DL (ref 2.5–4.9)
PHOSPHATE SERPL-MCNC: 3.2 MG/DL (ref 2.5–4.9)
PHOSPHATE SERPL-MCNC: 3.3 MG/DL (ref 2.5–4.9)
PHOSPHATE SERPL-MCNC: 4.4 MG/DL (ref 2.5–4.9)
PHOSPHATE SERPL-MCNC: 6.9 MG/DL (ref 2.5–4.9)
PLATELET # BLD AUTO: 136 10(3)UL (ref 150–450)
PLATELET # BLD AUTO: 151 10(3)UL (ref 150–450)
PLATELET # BLD AUTO: 167 10(3)UL (ref 150–450)
PLATELET # BLD AUTO: 170 10(3)UL (ref 150–450)
PLATELET # BLD AUTO: 177 10(3)UL (ref 150–450)
PLATELET # BLD AUTO: 182 10(3)UL (ref 150–450)
PLATELET # BLD AUTO: 185 10(3)UL (ref 150–450)
PLATELET # BLD AUTO: 187 10(3)UL (ref 150–450)
PO2 BLDA: 104 MM HG (ref 80–100)
PO2 BLDA: 114 MM HG (ref 80–100)
PO2 BLDA: 120 MM HG (ref 80–100)
PO2 BLDA: 235 MM HG (ref 80–100)
PO2 BLDA: 235 MM HG (ref 80–100)
PO2 BLDA: 244 MM HG (ref 80–100)
PO2 BLDA: 256 MM HG (ref 80–100)
PO2 BLDA: 258 MM HG (ref 80–100)
PO2 BLDA: 269 MM HG (ref 80–100)
PO2 BLDA: 273 MM HG (ref 80–100)
PO2 BLDA: 274 MM HG (ref 80–100)
PO2 BLDA: 311 MM HG (ref 80–100)
PO2 BLDA: 362 MM HG (ref 80–100)
PO2 BLDA: 91 MM HG (ref 80–100)
POTASSIUM BLD-SCNC: 3.3 MMOL/L (ref 3.6–5.1)
POTASSIUM BLD-SCNC: 3.5 MMOL/L (ref 3.6–5.1)
POTASSIUM BLD-SCNC: 3.6 MMOL/L (ref 3.6–5.1)
POTASSIUM BLD-SCNC: 3.6 MMOL/L (ref 3.6–5.1)
POTASSIUM BLD-SCNC: 3.7 MMOL/L (ref 3.6–5.1)
POTASSIUM BLD-SCNC: 3.7 MMOL/L (ref 3.6–5.1)
POTASSIUM BLD-SCNC: 3.8 MMOL/L (ref 3.6–5.1)
POTASSIUM BLD-SCNC: 3.9 MMOL/L (ref 3.6–5.1)
POTASSIUM BLD-SCNC: 4.1 MMOL/L (ref 3.6–5.1)
POTASSIUM BLD-SCNC: 4.5 MMOL/L (ref 3.6–5.1)
POTASSIUM BLD-SCNC: 4.5 MMOL/L (ref 3.6–5.1)
POTASSIUM SERPL-SCNC: 3.5 MMOL/L (ref 3.5–5.1)
POTASSIUM SERPL-SCNC: 3.7 MMOL/L (ref 3.5–5.1)
POTASSIUM SERPL-SCNC: 3.7 MMOL/L (ref 3.5–5.1)
POTASSIUM SERPL-SCNC: 3.9 MMOL/L (ref 3.5–5.1)
POTASSIUM SERPL-SCNC: 4.1 MMOL/L (ref 3.5–5.1)
POTASSIUM SERPL-SCNC: 4.2 MMOL/L (ref 3.5–5.1)
POTASSIUM SERPL-SCNC: 4.2 MMOL/L (ref 3.5–5.1)
POTASSIUM SERPL-SCNC: 4.3 MMOL/L (ref 3.5–5.1)
POTASSIUM SERPL-SCNC: 4.3 MMOL/L (ref 3.5–5.1)
PROT SERPL-MCNC: 6.2 G/DL (ref 6.4–8.2)
PROT SERPL-MCNC: 6.3 G/DL (ref 6.4–8.2)
PROT SERPL-MCNC: 6.3 G/DL (ref 6.4–8.2)
PROT SERPL-MCNC: 6.5 G/DL (ref 6.4–8.2)
PROT SERPL-MCNC: 6.6 G/DL (ref 6.4–8.2)
PROT UR STRIP.AUTO-MCNC: 30 MG/DL
PROT UR STRIP.AUTO-MCNC: NEGATIVE MG/DL
PROT UR STRIP.AUTO-MCNC: NEGATIVE MG/DL
PROTHROMBIN TIME: 16.7 SECONDS (ref 11.6–14.8)
PROTHROMBIN TIME: 16.8 SECONDS (ref 11.6–14.8)
PROTHROMBIN TIME: 17.8 SECONDS (ref 11.6–14.8)
PROTHROMBIN TIME: 18.1 SECONDS (ref 11.6–14.8)
PROTHROMBIN TIME: 18.1 SECONDS (ref 11.6–14.8)
PROTHROMBIN TIME: 18.4 SECONDS (ref 11.6–14.8)
PUNCTURE CHARGE: YES
Q-T INTERVAL: 462 MS
QRS DURATION: 94 MS
QTC CALCULATION (BEZET): 445 MS
R AXIS: 62 DEGREES
RBC # BLD AUTO: 3.52 X10(6)UL
RBC # BLD AUTO: 3.75 X10(6)UL
RBC # BLD AUTO: 4.12 X10(6)UL
RBC # BLD AUTO: 4.23 X10(6)UL
RBC # BLD AUTO: 4.35 X10(6)UL
RBC # BLD AUTO: 4.39 X10(6)UL
RBC # BLD AUTO: 4.45 X10(6)UL
RBC # BLD AUTO: 4.67 X10(6)UL
RBC UR QL AUTO: NEGATIVE
RESP RATE: 18 BPM
RH BLOOD TYPE: POSITIVE
SAO2 % BLDA: 98.5 % (ref 94–100)
SARS-COV-2 RNA RESP QL NAA+PROBE: NOT DETECTED
SODIUM BLD-SCNC: 132 MMOL/L (ref 135–145)
SODIUM BLD-SCNC: 132 MMOL/L (ref 135–145)
SODIUM BLD-SCNC: 155 MMOL/L (ref 135–145)
SODIUM BLD-SCNC: 156 MMOL/L (ref 135–145)
SODIUM BLD-SCNC: 157 MMOL/L (ref 135–145)
SODIUM BLD-SCNC: 158 MMOL/L (ref 135–145)
SODIUM BLD-SCNC: 160 MMOL/L (ref 135–145)
SODIUM BLD-SCNC: 161 MMOL/L (ref 135–145)
SODIUM BLD-SCNC: 161 MMOL/L (ref 135–145)
SODIUM SERPL-SCNC: 140 MMOL/L (ref 136–145)
SODIUM SERPL-SCNC: 140 MMOL/L (ref 136–145)
SODIUM SERPL-SCNC: 149 MMOL/L (ref 136–145)
SODIUM SERPL-SCNC: 158 MMOL/L (ref 136–145)
SODIUM SERPL-SCNC: 159 MMOL/L (ref 136–145)
SODIUM SERPL-SCNC: 160 MMOL/L (ref 136–145)
SODIUM SERPL-SCNC: 162 MMOL/L (ref 136–145)
SODIUM SERPL-SCNC: 162 MMOL/L (ref 136–145)
SP GR UR STRIP.AUTO: 1.01 (ref 1–1.03)
SP GR UR STRIP.AUTO: 1.02 (ref 1–1.03)
SP GR UR STRIP.AUTO: 1.03 (ref 1–1.03)
SPECIMEN VOL 24H UR: 500 ML
T AXIS: 64 DEGREES
TIDAL VOLUME: 440 ML
TIDAL VOLUME: 550 ML
TIDAL VOLUME: 600 ML
TRIGL SERPL-MCNC: 115 MG/DL (ref 30–149)
TROPONIN I HIGH SENSITIVITY: 8 NG/L
UROBILINOGEN UR STRIP.AUTO-MCNC: <2 MG/DL
VENT RATE: 10 /MIN
VENT RATE: 10 /MIN
VENT RATE: 14 /MIN
VENT RATE: 15 /MIN
VENT RATE: 16 /MIN
VENT RATE: 24 /MIN
VENT RATE: 30 /MIN
VENT RATE: 30 /MIN
VENTRICULAR RATE: 56 BPM
VLDLC SERPL CALC-MCNC: 18 MG/DL (ref 0–30)
WBC # BLD AUTO: 10.6 X10(3) UL (ref 4–11)
WBC # BLD AUTO: 10.6 X10(3) UL (ref 4–11)
WBC # BLD AUTO: 11.7 X10(3) UL (ref 4–11)
WBC # BLD AUTO: 13.1 X10(3) UL (ref 4–11)
WBC # BLD AUTO: 13.2 X10(3) UL (ref 4–11)
WBC # BLD AUTO: 14.1 X10(3) UL (ref 4–11)
WBC # BLD AUTO: 14.1 X10(3) UL (ref 4–11)
WBC # BLD AUTO: 6.5 X10(3) UL (ref 4–11)

## 2022-01-01 PROCEDURE — 99292 CRITICAL CARE ADDL 30 MIN: CPT | Performed by: INTERNAL MEDICINE

## 2022-01-01 PROCEDURE — 99232 SBSQ HOSP IP/OBS MODERATE 35: CPT | Performed by: HOSPITALIST

## 2022-01-01 PROCEDURE — 037G3ZZ DILATION OF INTRACRANIAL ARTERY, PERCUTANEOUS APPROACH: ICD-10-PCS | Performed by: RADIOLOGY

## 2022-01-01 PROCEDURE — 3077F SYST BP >= 140 MM HG: CPT | Performed by: INTERNAL MEDICINE

## 2022-01-01 PROCEDURE — 99291 CRITICAL CARE FIRST HOUR: CPT | Performed by: INTERNAL MEDICINE

## 2022-01-01 PROCEDURE — 70496 CT ANGIOGRAPHY HEAD: CPT | Performed by: EMERGENCY MEDICINE

## 2022-01-01 PROCEDURE — 71045 X-RAY EXAM CHEST 1 VIEW: CPT

## 2022-01-01 PROCEDURE — 70450 CT HEAD/BRAIN W/O DYE: CPT | Performed by: INTERNAL MEDICINE

## 2022-01-01 PROCEDURE — 5A1945Z RESPIRATORY VENTILATION, 24-96 CONSECUTIVE HOURS: ICD-10-PCS | Performed by: HOSPITALIST

## 2022-01-01 PROCEDURE — 3008F BODY MASS INDEX DOCD: CPT | Performed by: INTERNAL MEDICINE

## 2022-01-01 PROCEDURE — 70496 CT ANGIOGRAPHY HEAD: CPT | Performed by: OTHER

## 2022-01-01 PROCEDURE — 74150 CT ABDOMEN W/O CONTRAST: CPT

## 2022-01-01 PROCEDURE — 71045 X-RAY EXAM CHEST 1 VIEW: CPT | Performed by: HOSPITALIST

## 2022-01-01 PROCEDURE — 5A1935Z RESPIRATORY VENTILATION, LESS THAN 24 CONSECUTIVE HOURS: ICD-10-PCS | Performed by: EMERGENCY MEDICINE

## 2022-01-01 PROCEDURE — 03CG3ZZ EXTIRPATION OF MATTER FROM INTRACRANIAL ARTERY, PERCUTANEOUS APPROACH: ICD-10-PCS | Performed by: RADIOLOGY

## 2022-01-01 PROCEDURE — 93306 TTE W/DOPPLER COMPLETE: CPT | Performed by: INTERNAL MEDICINE

## 2022-01-01 PROCEDURE — 99223 1ST HOSP IP/OBS HIGH 75: CPT | Performed by: HOSPITALIST

## 2022-01-01 PROCEDURE — 99232 SBSQ HOSP IP/OBS MODERATE 35: CPT | Performed by: NURSE PRACTITIONER

## 2022-01-01 PROCEDURE — 93306 TTE W/DOPPLER COMPLETE: CPT | Performed by: OTHER

## 2022-01-01 PROCEDURE — 70498 CT ANGIOGRAPHY NECK: CPT | Performed by: EMERGENCY MEDICINE

## 2022-01-01 PROCEDURE — 70551 MRI BRAIN STEM W/O DYE: CPT | Performed by: OTHER

## 2022-01-01 PROCEDURE — 3078F DIAST BP <80 MM HG: CPT | Performed by: INTERNAL MEDICINE

## 2022-01-01 PROCEDURE — 99291 CRITICAL CARE FIRST HOUR: CPT | Performed by: OTHER

## 2022-01-01 PROCEDURE — 70450 CT HEAD/BRAIN W/O DYE: CPT | Performed by: EMERGENCY MEDICINE

## 2022-01-01 PROCEDURE — 0BH17EZ INSERTION OF ENDOTRACHEAL AIRWAY INTO TRACHEA, VIA NATURAL OR ARTIFICIAL OPENING: ICD-10-PCS | Performed by: EMERGENCY MEDICINE

## 2022-01-01 RX ORDER — VERAPAMIL HYDROCHLORIDE 2.5 MG/ML
INJECTION, SOLUTION INTRAVENOUS
Status: DISCONTINUED
Start: 2022-01-01 | End: 2022-01-01 | Stop reason: WASHOUT

## 2022-01-01 RX ORDER — CALCIUM GLUCONATE 20 MG/ML
2 INJECTION, SOLUTION INTRAVENOUS ONCE
Status: COMPLETED | OUTPATIENT
Start: 2022-01-01 | End: 2022-01-01

## 2022-01-01 RX ORDER — CHLORHEXIDINE GLUCONATE 0.12 MG/ML
15 RINSE ORAL
Status: DISCONTINUED | OUTPATIENT
Start: 2022-01-01 | End: 2022-01-01

## 2022-01-01 RX ORDER — ATORVASTATIN CALCIUM 80 MG/1
80 TABLET, FILM COATED ORAL NIGHTLY
Status: DISCONTINUED | OUTPATIENT
Start: 2022-01-01 | End: 2022-01-01

## 2022-01-01 RX ORDER — ASPIRIN 300 MG/1
300 SUPPOSITORY RECTAL ONCE
Status: COMPLETED | OUTPATIENT
Start: 2022-01-01 | End: 2022-01-01

## 2022-01-01 RX ORDER — HEPARIN SODIUM 1000 [USP'U]/ML
30000 INJECTION, SOLUTION INTRAVENOUS; SUBCUTANEOUS ONCE
Status: DISCONTINUED | OUTPATIENT
Start: 2022-01-01 | End: 2022-05-24

## 2022-01-01 RX ORDER — HYDRALAZINE HYDROCHLORIDE 20 MG/ML
10 INJECTION INTRAMUSCULAR; INTRAVENOUS EVERY 2 HOUR PRN
Status: DISCONTINUED | OUTPATIENT
Start: 2022-01-01 | End: 2022-01-01

## 2022-01-01 RX ORDER — ETOMIDATE 2 MG/ML
INJECTION INTRAVENOUS
Status: DISCONTINUED
Start: 2022-01-01 | End: 2022-01-01 | Stop reason: WASHOUT

## 2022-01-01 RX ORDER — LIDOCAINE HYDROCHLORIDE 10 MG/ML
INJECTION, SOLUTION INFILTRATION; PERINEURAL
Status: DISCONTINUED
Start: 2022-01-01 | End: 2022-01-01 | Stop reason: WASHOUT

## 2022-01-01 RX ORDER — ONDANSETRON 2 MG/ML
4 INJECTION INTRAMUSCULAR; INTRAVENOUS EVERY 6 HOURS PRN
Status: DISCONTINUED | OUTPATIENT
Start: 2022-01-01 | End: 2022-01-01

## 2022-01-01 RX ORDER — POTASSIUM CHLORIDE 29.8 MG/ML
40 INJECTION INTRAVENOUS
Status: DISCONTINUED | OUTPATIENT
Start: 2022-01-01 | End: 2022-01-01

## 2022-01-01 RX ORDER — LEVOFLOXACIN 5 MG/ML
750 INJECTION, SOLUTION INTRAVENOUS EVERY 24 HOURS
Status: DISCONTINUED | OUTPATIENT
Start: 2022-01-01 | End: 2022-01-01

## 2022-01-01 RX ORDER — BUMETANIDE 0.25 MG/ML
10 INJECTION, SOLUTION INTRAMUSCULAR; INTRAVENOUS
Status: DISCONTINUED | OUTPATIENT
Start: 2022-01-01 | End: 2022-01-01

## 2022-01-01 RX ORDER — METOPROLOL TARTRATE 5 MG/5ML
5 INJECTION INTRAVENOUS
Status: DISCONTINUED | OUTPATIENT
Start: 2022-01-01 | End: 2022-01-01

## 2022-01-01 RX ORDER — ACETAMINOPHEN 650 MG/1
650 SUPPOSITORY RECTAL EVERY 4 HOURS PRN
Status: DISCONTINUED | OUTPATIENT
Start: 2022-01-01 | End: 2022-01-01

## 2022-01-01 RX ORDER — ALBUMIN (HUMAN) 12.5 G/50ML
25 SOLUTION INTRAVENOUS
Status: DISCONTINUED | OUTPATIENT
Start: 2022-01-01 | End: 2022-01-01

## 2022-01-01 RX ORDER — VANCOMYCIN HYDROCHLORIDE
1500 EVERY 12 HOURS
Status: DISCONTINUED | OUTPATIENT
Start: 2022-01-01 | End: 2022-05-17

## 2022-01-01 RX ORDER — ALBUTEROL SULFATE 2.5 MG/3ML
2.5 SOLUTION RESPIRATORY (INHALATION) EVERY 4 HOURS PRN
Status: DISCONTINUED | OUTPATIENT
Start: 2022-01-01 | End: 2022-01-01

## 2022-01-01 RX ORDER — HEPARIN SODIUM 1000 [USP'U]/ML
INJECTION, SOLUTION INTRAVENOUS; SUBCUTANEOUS
Status: COMPLETED
Start: 2022-01-01 | End: 2022-01-01

## 2022-01-01 RX ORDER — ENOXAPARIN SODIUM 100 MG/ML
40 INJECTION SUBCUTANEOUS DAILY
Status: DISCONTINUED | OUTPATIENT
Start: 2022-01-01 | End: 2022-01-01

## 2022-01-01 RX ORDER — ATORVASTATIN CALCIUM 40 MG/1
40 TABLET, FILM COATED ORAL ONCE
Status: COMPLETED | OUTPATIENT
Start: 2022-01-01 | End: 2022-01-01

## 2022-01-01 RX ORDER — CALCIUM GLUCONATE 20 MG/ML
2 INJECTION, SOLUTION INTRAVENOUS
Status: DISCONTINUED | OUTPATIENT
Start: 2022-01-01 | End: 2022-01-01

## 2022-01-01 RX ORDER — FUROSEMIDE 10 MG/ML
40 INJECTION INTRAMUSCULAR; INTRAVENOUS
Status: DISCONTINUED | OUTPATIENT
Start: 2022-01-01 | End: 2022-01-01

## 2022-01-01 RX ORDER — LABETALOL HYDROCHLORIDE 5 MG/ML
10 INJECTION, SOLUTION INTRAVENOUS EVERY 10 MIN PRN
Status: DISCONTINUED | OUTPATIENT
Start: 2022-01-01 | End: 2022-01-01

## 2022-01-01 RX ORDER — LABETALOL HYDROCHLORIDE 5 MG/ML
10 INJECTION, SOLUTION INTRAVENOUS EVERY 10 MIN PRN
Status: COMPLETED | OUTPATIENT
Start: 2022-01-01 | End: 2022-01-01

## 2022-01-01 RX ORDER — DEXTROSE MONOHYDRATE 50 MG/ML
INJECTION, SOLUTION INTRAVENOUS ONCE
Status: COMPLETED | OUTPATIENT
Start: 2022-01-01 | End: 2022-01-01

## 2022-01-01 RX ORDER — SENNOSIDES 8.8 MG/5ML
10 LIQUID ORAL NIGHTLY PRN
Status: DISCONTINUED | OUTPATIENT
Start: 2022-01-01 | End: 2022-01-01

## 2022-01-01 RX ORDER — MANNITOL 20 G/100ML
INJECTION, SOLUTION INTRAVENOUS
Status: COMPLETED
Start: 2022-01-01 | End: 2022-01-01

## 2022-01-01 RX ORDER — ACETAMINOPHEN 325 MG/1
650 TABLET ORAL EVERY 4 HOURS PRN
Status: DISCONTINUED | OUTPATIENT
Start: 2022-01-01 | End: 2022-01-01

## 2022-01-01 RX ORDER — BUMETANIDE 0.25 MG/ML
5 INJECTION, SOLUTION INTRAMUSCULAR; INTRAVENOUS
Status: DISCONTINUED | OUTPATIENT
Start: 2022-01-01 | End: 2022-01-01

## 2022-01-01 RX ORDER — PHENYLEPHRINE HCL IN 0.9% NACL 50MG/250ML
PLASTIC BAG, INJECTION (ML) INTRAVENOUS CONTINUOUS PRN
Status: DISCONTINUED | OUTPATIENT
Start: 2022-01-01 | End: 2022-01-01

## 2022-01-01 RX ORDER — MAGNESIUM SULFATE HEPTAHYDRATE 40 MG/ML
2 INJECTION, SOLUTION INTRAVENOUS
Status: DISCONTINUED | OUTPATIENT
Start: 2022-01-01 | End: 2022-01-01

## 2022-01-01 RX ORDER — VECURONIUM BROMIDE 1 MG/ML
10 INJECTION, POWDER, LYOPHILIZED, FOR SOLUTION INTRAVENOUS ONCE
Status: COMPLETED | OUTPATIENT
Start: 2022-01-01 | End: 2022-01-01

## 2022-01-01 RX ORDER — POLYETHYLENE GLYCOL 3350 17 G/17G
17 POWDER, FOR SOLUTION ORAL DAILY PRN
Status: DISCONTINUED | OUTPATIENT
Start: 2022-01-01 | End: 2022-01-01

## 2022-01-01 RX ORDER — SODIUM PHOSPHATE, DIBASIC AND SODIUM PHOSPHATE, MONOBASIC 7; 19 G/133ML; G/133ML
1 ENEMA RECTAL ONCE AS NEEDED
Status: DISCONTINUED | OUTPATIENT
Start: 2022-01-01 | End: 2022-01-01

## 2022-01-01 RX ORDER — SODIUM CHLORIDE 450 MG/100ML
INJECTION, SOLUTION INTRAVENOUS CONTINUOUS
Status: DISCONTINUED | OUTPATIENT
Start: 2022-01-01 | End: 2022-01-01

## 2022-01-01 RX ORDER — BISACODYL 10 MG
10 SUPPOSITORY, RECTAL RECTAL
Status: DISCONTINUED | OUTPATIENT
Start: 2022-01-01 | End: 2022-01-01

## 2022-01-01 RX ORDER — HYDRALAZINE HYDROCHLORIDE 20 MG/ML
20 INJECTION INTRAMUSCULAR; INTRAVENOUS
Status: DISCONTINUED | OUTPATIENT
Start: 2022-01-01 | End: 2022-01-01

## 2022-01-01 RX ORDER — METOCLOPRAMIDE HYDROCHLORIDE 5 MG/ML
10 INJECTION INTRAMUSCULAR; INTRAVENOUS EVERY 8 HOURS PRN
Status: DISCONTINUED | OUTPATIENT
Start: 2022-01-01 | End: 2022-01-01

## 2022-01-01 RX ORDER — ATORVASTATIN CALCIUM 40 MG/1
40 TABLET, FILM COATED ORAL NIGHTLY
Status: DISCONTINUED | OUTPATIENT
Start: 2022-01-01 | End: 2022-01-01

## 2022-01-01 RX ORDER — SODIUM CHLORIDE 9 MG/ML
INJECTION, SOLUTION INTRAVENOUS CONTINUOUS
Status: DISCONTINUED | OUTPATIENT
Start: 2022-01-01 | End: 2022-01-01

## 2022-01-01 RX ORDER — FUROSEMIDE 10 MG/ML
20 INJECTION INTRAMUSCULAR; INTRAVENOUS
Status: DISCONTINUED | OUTPATIENT
Start: 2022-01-01 | End: 2022-01-01

## 2022-01-01 RX ORDER — DESMOPRESSIN ACETATE 4 UG/ML
2 INJECTION, SOLUTION INTRAVENOUS; SUBCUTANEOUS
Status: DISCONTINUED | OUTPATIENT
Start: 2022-01-01 | End: 2022-01-01

## 2022-03-22 ENCOUNTER — TELEPHONE (OUTPATIENT)
Dept: FAMILY MEDICINE CLINIC | Facility: CLINIC | Age: 67
End: 2022-03-22

## 2022-03-22 NOTE — TELEPHONE ENCOUNTER
Spouse states patient needs to call his insurance to check with insurance if colonoscopy is covered.

## 2022-03-22 NOTE — TELEPHONE ENCOUNTER
----- Message from Arpita Alfaro MD sent at 3/21/2022 11:48 AM CDT -----  Pt is due for C scope. Referral and Fit test was provided to him last year. Please have him decide what he is going to completed. If fit test have him  (or mail to his home) and bring to his next visit with me. He is due to a follow up with me.       Dolores Pillai MD

## 2022-03-25 ENCOUNTER — TELEPHONE (OUTPATIENT)
Dept: FAMILY MEDICINE CLINIC | Facility: CLINIC | Age: 67
End: 2022-03-25

## 2022-03-25 NOTE — TELEPHONE ENCOUNTER
Patient's wife called back and was informed that a referral was already approved for him to see Dr Abdoul Morocho and it was ok to schedule it. A FIT test card was offered and she is ok with him to get test done prior to the consult, referral along with fit card was mailed out to them.

## 2022-03-29 ENCOUNTER — TELEPHONE (OUTPATIENT)
Dept: FAMILY MEDICINE CLINIC | Facility: CLINIC | Age: 67
End: 2022-03-29

## 2022-03-29 NOTE — TELEPHONE ENCOUNTER
----- Message from Maite Randall MD sent at 3/29/2022  8:49 AM CDT -----  He should schedule appt for follow up on HTN when he has a chance. Bring in FIT card that was mailed to him on 3/25/2022.   Thank you        PQ

## 2022-05-12 ENCOUNTER — HOSPITAL ENCOUNTER (INPATIENT)
Facility: HOSPITAL | Age: 67
LOS: 1 days | Discharge: ANOTHER HOSPITAL | End: 2022-05-13
Attending: EMERGENCY MEDICINE | Admitting: HOSPITALIST
Payer: COMMERCIAL

## 2022-05-12 ENCOUNTER — APPOINTMENT (OUTPATIENT)
Dept: CT IMAGING | Facility: HOSPITAL | Age: 67
End: 2022-05-12
Attending: EMERGENCY MEDICINE
Payer: COMMERCIAL

## 2022-05-12 PROBLEM — I63.9 CEREBROVASCULAR ACCIDENT (CVA) (HCC): Status: ACTIVE | Noted: 2022-05-12

## 2022-05-12 PROBLEM — I63.9 CEREBROVASCULAR ACCIDENT (CVA) (HCC): Status: ACTIVE | Noted: 2022-01-01

## 2022-05-12 NOTE — ED INITIAL ASSESSMENT (HPI)
Pt arrived to ED from home c/o dizziness and vomiting at approximately 1800 today. Pt not throwing up during triage assessment. Pt also c/o cough starting yesterday. Pt denies abdominal pain, reports some diarrhea.

## 2022-05-13 ENCOUNTER — APPOINTMENT (OUTPATIENT)
Dept: GENERAL RADIOLOGY | Facility: HOSPITAL | Age: 67
End: 2022-05-13
Attending: HOSPITALIST
Payer: COMMERCIAL

## 2022-05-13 ENCOUNTER — APPOINTMENT (OUTPATIENT)
Dept: CV DIAGNOSTICS | Facility: HOSPITAL | Age: 67
End: 2022-05-13
Attending: Other
Payer: COMMERCIAL

## 2022-05-13 ENCOUNTER — APPOINTMENT (OUTPATIENT)
Dept: CT IMAGING | Facility: HOSPITAL | Age: 67
End: 2022-05-13
Attending: Other
Payer: COMMERCIAL

## 2022-05-13 ENCOUNTER — HOSPITAL ENCOUNTER (INPATIENT)
Facility: HOSPITAL | Age: 67
End: 2022-05-13
Attending: INTERNAL MEDICINE | Admitting: HOSPITALIST
Payer: COMMERCIAL

## 2022-05-13 ENCOUNTER — APPOINTMENT (OUTPATIENT)
Dept: CT IMAGING | Facility: HOSPITAL | Age: 67
End: 2022-05-13
Attending: INTERNAL MEDICINE
Payer: COMMERCIAL

## 2022-05-13 ENCOUNTER — APPOINTMENT (OUTPATIENT)
Dept: MRI IMAGING | Facility: HOSPITAL | Age: 67
End: 2022-05-13
Attending: Other
Payer: COMMERCIAL

## 2022-05-13 ENCOUNTER — ANESTHESIA (OUTPATIENT)
Dept: INTERVENTIONAL RADIOLOGY/VASCULAR | Facility: HOSPITAL | Age: 67
End: 2022-05-13
Payer: COMMERCIAL

## 2022-05-13 ENCOUNTER — APPOINTMENT (OUTPATIENT)
Dept: INTERVENTIONAL RADIOLOGY/VASCULAR | Facility: HOSPITAL | Age: 67
End: 2022-05-13
Attending: RADIOLOGY
Payer: COMMERCIAL

## 2022-05-13 PROBLEM — I63.9 CEREBROVASCULAR ACCIDENT (CVA), UNSPECIFIED MECHANISM (HCC): Status: ACTIVE | Noted: 2022-01-01

## 2022-05-13 PROBLEM — I63.9 CEREBROVASCULAR ACCIDENT (CVA), UNSPECIFIED MECHANISM (HCC): Status: ACTIVE | Noted: 2022-05-13

## 2022-05-13 RX ORDER — MIDAZOLAM HYDROCHLORIDE 1 MG/ML
INJECTION INTRAMUSCULAR; INTRAVENOUS AS NEEDED
Status: DISCONTINUED | OUTPATIENT
Start: 2022-05-13 | End: 2022-05-13 | Stop reason: SURG

## 2022-05-13 RX ORDER — SODIUM CHLORIDE 9 MG/ML
INJECTION, SOLUTION INTRAVENOUS CONTINUOUS PRN
Status: DISCONTINUED | OUTPATIENT
Start: 2022-05-13 | End: 2022-05-13 | Stop reason: SURG

## 2022-05-13 RX ORDER — ROCURONIUM BROMIDE 10 MG/ML
INJECTION, SOLUTION INTRAVENOUS AS NEEDED
Status: DISCONTINUED | OUTPATIENT
Start: 2022-05-13 | End: 2022-05-13 | Stop reason: SURG

## 2022-05-13 RX ORDER — PHENYLEPHRINE HCL 10 MG/ML
VIAL (ML) INJECTION AS NEEDED
Status: DISCONTINUED | OUTPATIENT
Start: 2022-05-13 | End: 2022-05-13 | Stop reason: SURG

## 2022-05-13 RX ADMIN — MIDAZOLAM HYDROCHLORIDE 1 MG: 1 INJECTION INTRAMUSCULAR; INTRAVENOUS at 11:53:00

## 2022-05-13 RX ADMIN — ROCURONIUM BROMIDE 50 MG: 10 INJECTION, SOLUTION INTRAVENOUS at 13:16:00

## 2022-05-13 RX ADMIN — ROCURONIUM BROMIDE 50 MG: 10 INJECTION, SOLUTION INTRAVENOUS at 11:53:00

## 2022-05-13 RX ADMIN — SODIUM CHLORIDE: 9 INJECTION, SOLUTION INTRAVENOUS at 15:21:00

## 2022-05-13 RX ADMIN — PHENYLEPHRINE HCL 100 MCG: 10 MG/ML VIAL (ML) INJECTION at 13:00:00

## 2022-05-13 RX ADMIN — SODIUM CHLORIDE: 9 INJECTION, SOLUTION INTRAVENOUS at 11:50:00

## 2022-05-13 RX ADMIN — MIDAZOLAM HYDROCHLORIDE 1 MG: 1 INJECTION INTRAMUSCULAR; INTRAVENOUS at 13:35:00

## 2022-05-13 RX ADMIN — PHENYLEPHRINE HCL 50 MCG: 10 MG/ML VIAL (ML) INJECTION at 14:06:00

## 2022-05-13 RX ADMIN — PHENYLEPHRINE HCL 200 MCG: 10 MG/ML VIAL (ML) INJECTION at 12:21:00

## 2022-05-13 NOTE — RESPIRATORY THERAPY NOTE
Per Dr Yvonne Merino place pt on inline ET CO2 gather base line and aim to drop 5-10 from that. Baseline 32 on 14/600 50% +5  Dr Keena Blake on consult made vent changes  30/440/50%/2.5 ABG at 1800.

## 2022-05-13 NOTE — CM/SW NOTE
[facility] ambulance with lights and sirens ordered ETA 10:45    Patient to go directly to [facility] to register and then to angio cath lab  Gladys stroke navigator aware     DR Linda Calvin accepted    Rn to Rn report [phone] [facility] FOR [facility] stroke navigator)    PCS completed    Melanie Kilgore, [address] [facility] ,  Javier Shama Sun Transitions Leader  [phone]

## 2022-05-13 NOTE — PROGRESS NOTES
05/13/22 1751   Clinical Encounter Type   Visited With Patient   Routine Visit Introduction   Continue Visiting Yes   Crisis Visit Critical care   Family Spiritual Encounters   Family Coping Sadness   Taxonomy   Intended Effects Build relationship of care and support   Methods Offer spiritual/Sabianist support   Interventions Active listening; Acknowledge response to difficult experience;Provide compassionate touch; Share words of hope and inspiration;Silent prayer    responded to a Spiritual Care request via pager to intervene with the family of patient who is critical. Patient just suffered a stroke and family is distraught and sadden. Patient's  and cousin were present at bedside and grieving openly. RN notified  that the doctor told family that surgery may not be an option until patient is responsive. Family begin to weep and pray harder.  provided pastoral support, active listening and prayer to the family.  Spiritual care can be requested via an Epic consult or by pager at [facility]

## 2022-05-13 NOTE — ED QUICK NOTES
Code stroke called per MD order. Patient arrived to room weak on left side with facial droop patient was not able to fall commands was not speaking.

## 2022-05-13 NOTE — ED QUICK NOTES
[place] at bedside with wife and patient. Patient vital signs stable at this time will continue to monitor.

## 2022-05-13 NOTE — OCCUPATIONAL THERAPY NOTE
Orders received. Chart reviewed. Pt intubated. Will place pt on hold. Pt will require new OT orders to resume therapy services.     Nitin Rosenbaum   Occupational Therapist  [facility]

## 2022-05-13 NOTE — PLAN OF CARE
Bilateral wrist restraints continued for patient safety. ROM performed every 2 hours.      Problem: Safety Risk - Non-Violent Restraints  Goal: Patient will remain free from self-harm  Description: INTERVENTIONS:  - Apply the least restrictive restraint to prevent harm  - Notify patient and family of reasons restraints applied  - Assess for any contributing factors to confusion (electrolyte disturbances, delirium, medications)  - Discontinue any unnecessary medical devices as soon as possible  - Assess the patient's physical comfort, circulation, skin condition, hydration, nutrition and elimination needs   - Reorient and redirection as needed  - Assess for the need to continue restraints  Outcome: Progressing

## 2022-05-13 NOTE — PROCEDURES
[facility]  Interventional Neuroradiology Procedure Note      Procedure: Stroke thrombectomy    Pre-Op Diagnosis:  Basilar stroke    Post-Op Diagnosis: same    Surgeon: Loraine Guido MD    Brief History:  Mr. Khai García initially presented yesterday with nausea and vomiting. He developed left hemiplegia last evening. He decompensated overnight requiring intubation. I was contacted while patient was getting his second CTA this morning, at which time, he was completely unresponsive, with no pupillary response and no corneal reflex, also quadriplegic. CTA demonstrated poor filling in the posterior circulation, likely with basilar clot. CT also demonstrated a swollen left SCA territory infarct, with likely more extensive cerebellar and pontine infarction. Given the extremely poor clinical exam, we decided to obtain a stat MR DWI to determine the extent of the infarct. Infarct burden was unfortunately devastating, with involvement of the entire left cerebellar hemisphere, a portion of the right cerebellar hemisphere, a large potion of the right tavo, and a portion of the left tavo, as well as the left dorsolateral medulla. He had already developed early swelling in the posterior fossa. His CAPS score was a 4, indicating very poor chances for a favorable outcome. Neurosurgery was contacted early in case the family opted for aggressive life-saving measures. After the MRI, however, the patient's exam improved, and he opened his eyes, and squeezed the right hand. Given this improvement, and the family's desire for aggressive treatment, we transferred to THE [facility] for thrombectomy and then possible craniectomy. Technique/Findings:      A 6 [place] neuron max was placed in the right CFA. 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 There were collateral supplies from deep cervicals and occipital arteries.   Angiography of the right deep cervical demonstrated clot in the basilar. The left vert, occluded at its origin, arose directly from the aortic arch. After exploration with a glide wire, we were able to get into the left vert with a 5 [place] Konger vert catheter. However, the sheath would not follow, so we performed gentle angioplasty with a 3mm gateway at the origin, and then the neuron max was able to traverse the stenosis at the origin of the left vert into the mid left v2 segment. We then used a red 72 aspiration catheter and 3 max to perform two passes, removing a large amount of clot, and resulting in TICI 2b reperfusion. Non-flow-limiting dissection of the small left vert was noted. The left vert origin stenosis remained patent after removing the neuron max and the vert catheter. We closed the right groin with an angioseal.    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 Recanalizing the left vert, dissection of the left vertebral artery was noted, but this was not flow-limiting and did not require placement of a stent. Medications:  Medications administered by anesthesiology staff are documented separately in epic    Specimens: none    Blood Loss:  < 25 mL     Assessment/Plan:  - Lay flat or reverse Trendelenburg 4 hours   - Assess vitals/access site/pulses and neurological status in ICU  - TICI 2b reperfusion of basilar artery  - recanalization of occluded left vert origin. There are well-developed collateral supplies from deep cervical and occipital branches. - dissection of V2 segment of left vert noted -- not flow-limiting  - Head CT on the way up  - Prognosis remains guarded given the extent of infarction on the pre-procedure MRI  - At risk of posterior fossa mass effect given extent of infarction.   Neurosurgery aware  - Plan to start cangrelor gtt pending CT results  - SBP goals 100 - 150    Curtis Buck MD  5/13/2022  3:36 PM

## 2022-05-13 NOTE — SLP NOTE
BSE orders received/acknowledged. Pt still intubated and not appropriate for BSE at this time. Will f/u when Pt extubated/appropriate for swallow assessment. Collaborated with RN regarding Pt's swallowing plan of care.        Priti Jaimes M.S. CCC/SLP  Speech-Language Pathologist  [facility]  #[id number]

## 2022-05-13 NOTE — PROGRESS NOTES
FiO2 and PEEP weaned after ABG results.  PEEP still slightly elevated due to presence of frothy secretions       05/13/22 0520   Settings   FiO2 (%) 60 %  (weaned)   PEEP/CPAP (cm H2O) 7 cm H20  (weaned)

## 2022-05-13 NOTE — PHYSICAL THERAPY NOTE
PT orders received via stroke protocol, chart reviewed. Pt currently intubated, sedated and on wrist restraints-not appropriate for PT. Will d/c PT orders at this time. Please re-consult when pt is appropriate to resume PT services.     Nicho Ford, [address] [place] [address]

## 2022-05-13 NOTE — RESPIRATORY THERAPY NOTE
Night EOS Report      History of Present Illness: Derrick Phillip was admitted to ICU for CVA tonight. Patient was originally on RA. Hours later his O2 demands increased. Nurse called physician and RT to bed side due to acute hypoxic respiratory distress. Assessment:  Upon arrival patient was tachypneic, diaphoretic and hypoxic while on NRB. There was an episode where HR dropped to the 40s but recovered once patient was manually ventilated with resuscitation bag and mask while RSI meds were being prepared and pushed. We moved forward with intubating patient. Copious amount of frothy blood was coming out of ETT once it was placed. Patient was thoroughly suctioned and placed on vent after tube place was verified with Colormetric CO2 and bilateral chest sounds. CXR later confirmed placement of tube as well. O2 saturations stabilized. PEEP set slightly higher to keep edema down. Ventilator Settings: AC/VC- f18/ Vt 500/+10/ 100%    Airway: ETT 7.5, [email or web address]      Last ABG done on: (pending)          Imaging  Chest X-Ray: (impression pending)        Plan: Patient is currently requiring full ventilatory support at this time for hypoxia and to maintain airway.                  Ivelisse Abreu, RRT-ACCS  5/13/2022  03:46

## 2022-05-13 NOTE — VASCULAR ACCESS
Consulted to place picc line. Arrived to patient's room, but unable to assess patient due to family meeting with neurosurgery.

## 2022-05-13 NOTE — ANESTHESIA POSTPROCEDURE EVALUATION
[address] Patient Status:  Inpatient   Age/Gender 78year old male MRN [id number]   Location [facility] [address] [facility] Attending Elizabeth Crespo MD   Hosp Day # 0 PCP Maite Randall MD       Anesthesia Post-op Note        Procedure Summary     Date: 05/13/22 Room / Location: [facility] Interventional Suites    Anesthesia Start: 1150 Anesthesia Stop: 7031    Procedure: IR INTRA ARTERIAL STROKE INTERVENTION Diagnosis: (stroke)    Scheduled Providers:  Anesthesiologist: Damon Duane, MD    Anesthesia Type: general ASA Status: 4 - Emergent          Anesthesia Type: general    Vitals Value Taken Time   /48 05/13/22 1602   Temp 98 05/13/22 1602   Pulse 68 05/13/22 1602   Resp 17 05/13/22 1602   SpO2 98% 05/13/22 1602       Patient Location: ICU    Anesthesia Type: general    Airway Patency: intubated    Postop Pain Control: sedated until time of extubation    Mental Status: sedated until time of extubation    Nausea/Vomiting: none    Cardiopulmonary/Hydration status: stable euvolemic    Complications: no apparent anesthesia related complications    Postop vital signs: stable    Dental Exam: Unchanged from Preop    Sign out given to ICU staff.

## 2022-05-13 NOTE — CM/SW NOTE
MD order noted for Mima 77 pema.   CM/SW will follow for PT/OT evaluation.    RN to call CM/SW for any further dc needs

## 2022-05-13 NOTE — PROCEDURES
ICU nocturnist    Patient minimally responsive, rapidly desaturating with pink frothy sputum, decision made to intubate patient. 7.5 ET tube used, unable to visualize cords, tube passed underneath epiglottis, bilateral breath sounds appreciated, improving O2 saturations and CO2 detector with color change.   Chest x-ray pending

## 2022-05-13 NOTE — PLAN OF CARE
Guille Courser was received at about 0100, found to be have left sided hemiplegia, see neuro assessment. Patient was able to open his eyes to speech and nod his head intermittently to yes or no questions. Right side remained strong. Dr. Yessenia Bush called. At 0300, found to have increasing respiratory distress. Dr. Juarez November to bedside. Frothy hemoptysis noted pre and post intubation. Post intubation, decreasing neurological status, Dr. Yessenia Bush notified and at bedside. See orders. Ct completed. Stat MRI in progress. Sedation used for high respiratory rate. Off this AM. Bilateral wrist restraints continued for patient safety. ROM performed every 2 hours. Patients family updated at bedside. Problem: Patient Centered Care  Goal: Patient preferences are identified and integrated in the patient's plan of care  Description: Interventions:  - What would you like us to know as we care for you? From home with wife, has not seen a drDonell In many years.    - Provide timely, complete, and accurate information to patient/family  - Incorporate patient and family knowledge, values, beliefs, and cultural backgrounds into the planning and delivery of care  - Encourage patient/family to participate in care and decision-making at the level they choose  - Honor patient and family perspectives and choices  Outcome: Progressing     Problem: Safety Risk - Non-Violent Restraints  Goal: Patient will remain free from self-harm  Description: INTERVENTIONS:  - Apply the least restrictive restraint to prevent harm  - Notify patient and family of reasons restraints applied  - Assess for any contributing factors to confusion (electrolyte disturbances, delirium, medications)  - Discontinue any unnecessary medical devices as soon as possible  - Assess the patient's physical comfort, circulation, skin condition, hydration, nutrition and elimination needs   - Reorient and redirection as needed  - Assess for the need to continue restraints  5/13/2022 0827 by Michael Dimas RN  Outcome: Progressing  5/13/2022 0728 by Michael Dimas RN  Outcome: Progressing     Problem: CARDIOVASCULAR - ADULT  Goal: Maintains optimal cardiac output and hemodynamic stability  Description: INTERVENTIONS:  - Monitor vital signs, rhythm, and trends  - Monitor for bleeding, hypotension and signs of decreased cardiac output  - Evaluate effectiveness of vasoactive medications to optimize hemodynamic stability  - Monitor arterial and/or venous puncture sites for bleeding and/or hematoma  - Assess quality of pulses, skin color and temperature  - Assess for signs of decreased coronary artery perfusion - ex.  Angina  - Evaluate fluid balance, assess for edema, trend weights  Outcome: Progressing     Problem: NEUROLOGICAL - ADULT  Goal: Absence of seizures  Description: INTERVENTIONS  - Monitor for seizure activity  - Administer anti-seizure medications as ordered  - Monitor neurological status  Outcome: Progressing  Goal: Remains free of injury related to seizure activity  Description: INTERVENTIONS:  - Maintain airway, patient safety  and administer oxygen as ordered  - Monitor patient for seizure activity, document and report duration and description of seizure to MD/LIP  - If seizure occurs, turn patient to side and suction secretions as needed  - Reorient patient post seizure  - Seizure pads on all 4 side rails  - Instruct patient/family to notify RN of any seizure activity  - Instruct patient/family to call for assistance with activity based on assessment  Outcome: Progressing     Problem: Patient/Family Goals  Goal: Patient/Family Long Term Goal  Description: Patient's Long Term Goal: improve neuro status    Interventions:  - See additional Care Plan goals for specific interventions  Outcome: Not Progressing  Goal: Patient/Family Short Term Goal  Description: Patient's Short Term Goal: regain strength on left side    Interventions:   -  - See additional Care Plan goals for specific interventions  Outcome: Not Progressing     Problem: CARDIOVASCULAR - ADULT  Goal: Absence of cardiac arrhythmias or at baseline  Description: INTERVENTIONS:  - Continuous cardiac monitoring, monitor vital signs, obtain 12 lead EKG if indicated  - Evaluate effectiveness of antiarrhythmic and heart rate control medications as ordered  - Initiate emergency measures for life threatening arrhythmias  - Monitor electrolytes and administer replacement therapy as ordered  Outcome: Not Progressing     Problem: NEUROLOGICAL - ADULT  Goal: Achieves stable or improved neurological status  Description: INTERVENTIONS  - Assess for and report changes in neurological status  - Initiate measures to prevent increased intracranial pressure  - Maintain blood pressure and fluid volume within ordered parameters to optimize cerebral perfusion and minimize risk of hemorrhage  - Monitor temperature, glucose, and sodium.  Initiate appropriate interventions as ordered  Outcome: Not Progressing  Goal: Achieves maximal functionality and self care  Description: INTERVENTIONS  - Monitor swallowing and airway patency with patient fatigue and changes in neurological status  - Encourage and assist patient to increase activity and self care with guidance from PT/OT  - Encourage visually impaired, hearing impaired and aphasic patients to use assistive/communication devices  Outcome: Not Progressing

## 2022-05-13 NOTE — ANESTHESIA PROCEDURE NOTES
Arterial Line  Performed by: Joseph Anglin MD  Authorized by: Joseph Anglin MD     General Information and Staff    Procedure Start:  5/13/2022 11:55 AM  Procedure End:  5/13/2022 11:57 AM  Anesthesiologist:  Joseph Anglin MD  Performed By:  Anesthesiologist  Patient Location:  OR  Indication: continuous blood pressure monitoring and blood sampling needed    Site Identification: surface landmarks    Preanesthetic Checklist: 2 patient identifiers, IV checked, risks and benefits discussed, monitors and equipment checked, pre-op evaluation, timeout performed, anesthesia consent and sterile technique used    Procedure Details    Catheter Size:  20 G  Catheter Length:  1 and 3/4 inchCatheter Type:  Arrow  Seldinger Technique?: Yes    Laterality:  LeftSite:  Radial artery  Site Prep: chlorhexidine  Line Secured:  Wrist Brace, tape and Tegaderm    Assessment    Events: patient tolerated procedure well with no complications      Medications      Additional Comments

## 2022-05-14 ENCOUNTER — APPOINTMENT (OUTPATIENT)
Dept: CV DIAGNOSTICS | Facility: HOSPITAL | Age: 67
End: 2022-05-14
Attending: INTERNAL MEDICINE
Payer: COMMERCIAL

## 2022-05-14 ENCOUNTER — APPOINTMENT (OUTPATIENT)
Dept: GENERAL RADIOLOGY | Facility: HOSPITAL | Age: 67
End: 2022-05-14
Payer: COMMERCIAL

## 2022-05-14 NOTE — PLAN OF CARE
Assumed care of patient from Cath lab at 1600. Patient is intubated and on propofol. Levo tirtrated to keep sbp between 100-150. Neuro checks completed per Neuro IR orders. CT results reported to Neuro critical care. Mannitol given as ordered for hydrocephalus. Per neuro, RT to hyperventilate patient and to watch Co2. Respiratory MD aware. ABG results reported to respiratory MD. Neurosurgery contacted for evaluation of further surgery for hydrocephalus, but neurosurgery said no intervention at this time. Pupilometer results are charted and patient not responding to pain stimulus even after sedation turned off. No cough or gag noted. Levo turned off due to hypertension and prn meds given to keep sbp 100-150. Q6 accucchecks. Malone in place with good UO. Order for Echo and PICC line in place for tomorrow. R groin is c/d/I with no palpable hematoma. Pulses all +2. All Mds and family of patient are updated on POC. Problem: NEUROLOGICAL - ADULT  Goal: Achieves stable or improved neurological status  Description: INTERVENTIONS  - Assess for and report changes in neurological status  - Initiate measures to prevent increased intracranial pressure  - Maintain blood pressure and fluid volume within ordered parameters to optimize cerebral perfusion and minimize risk of hemorrhage  - Monitor temperature, glucose, and sodium.  Initiate appropriate interventions as ordered  Outcome: Progressing  Goal: Absence of seizures  Description: INTERVENTIONS  - Monitor for seizure activity  - Administer anti-seizure medications as ordered  - Monitor neurological status  Outcome: Progressing  Goal: Remains free of injury related to seizure activity  Description: INTERVENTIONS:  - Maintain airway, patient safety  and administer oxygen as ordered  - Monitor patient for seizure activity, document and report duration and description of seizure to MD/LIP  - If seizure occurs, turn patient to side and suction secretions as needed  - Reorient patient post seizure  - Seizure pads on all 4 side rails  - Instruct patient/family to notify RN of any seizure activity  - Instruct patient/family to call for assistance with activity based on assessment  Outcome: Progressing  Goal: Achieves maximal functionality and self care  Description: INTERVENTIONS  - Monitor swallowing and airway patency with patient fatigue and changes in neurological status  - Encourage and assist patient to increase activity and self care with guidance from PT/OT  - Encourage visually impaired, hearing impaired and aphasic patients to use assistive/communication devices  Outcome: Progressing

## 2022-05-14 NOTE — PROGRESS NOTES
Presented to bedside to speak with family about patient status, per their request.  They requested update on his progress. Reviewed patient current status and concern of progressive loss of brain stem reflexes/signs of he most basic brain functions/reflexes (ie spontaneous respirations, pupillary responses, cough, gag, etc) with ultimate progression to brain death. Results from imaging with worsening edema, mass effect and effacement of brain structures due to the evolving infarct areas reviewed and questions answered as able. Explained that despite aggressive efforts with thrombectomy for clot retrieval and medications like mannitol for brain swelling--the patient is not showing signs of improvement or positive response. Reviewed that given his progressive worsening on exam and loss of reflexes surgical intervention is not an option at this time as it would not be of benefit to him in overall predicted outcome. Options of changing code status to DNAR/Full reviewed. Current support would continue but if his heart was to stop on its own we would not restart but instead let him pass peacefully. Wife questioned how \"long will he last\" if all supportive measures were withdrawn. I explained that the timing is not able to be predicted and is different for everyone but given his full dependence on vent support at this time, BP support with vasopressor, and lack of reflexes on exam, he would be unable to sustain these needs for himself which would then lead to his passing. Family was informed that they can choose to withdraw supportive measures at anytime and comfort focus option was also reviewed given wife's questioning. Opportunity for questions made available to wife, son and daughter--these were answered when needed.   Encouraged family to discuss the information provided, consider the patients wishes regarding end of life, ask questions of staff if needed, and to inform RN if the decision is made to change code status or further questions arise. RN Sesar Donnelly at bedside throughout discussion with patients wife, son, and daughter. Neuro [facility] aware of family request for discussion and my intent to speak with them.     Hema Aponte, APRN  Critical Care NP  Jessenia [phone]  Pgr: [phone]

## 2022-05-14 NOTE — PHYSICAL THERAPY NOTE
PT orders received, chart reviewed. Spoke with RN, pt is not responding and is not appropriate. Therefore, will DC PT orders. If pt status should change, please place new orders.  CM

## 2022-05-14 NOTE — PLAN OF CARE
Pt intubated, not sedated; pt does not respond to noxious stimuli, no gag or cough. Pupils nonreactive. Please see flow sheet for details. Pt has large amounts of urine output with hypotension. Jia Berkowitz APN kept updated throughout the shift. Fluid boluses given. Levophed gtt titrated to achieve -150. Mannitol given per order and held morning dose for elevated Osmo and Na. Family updated. Madeline Guy spoke with family and answered all the questions. Per son, they are not ready to make decision about the code status. For now they want everything to be done in case of emergency. Pt's HR occasionally drop to 40's with drop in BP. Will continue to closely monitor.  Report given to day shift RN

## 2022-05-14 NOTE — PROGRESS NOTES
Patient declared brain dead @ 21 . Family at bedside and informed by pulmonologist. Questions answered. [address] [facility] at bedside and notified patient family of being registered donor. Family at bedside. Report given to [facility] @ [phone].

## 2022-05-14 NOTE — PROGRESS NOTES
Apnea test performed at bedside this afternoon per RT department protocol. ABGs noted - will be uploaded to Epic. Time 0 PCO2 of 46 - time 10mins PCO2 of 81    The patient was observed during this interval and did not demonstrate any respiratory effort.     testing is confirmatory of brain death - time of death 1258pm    reviewed with RN, RT and family at bedside

## 2022-05-14 NOTE — PLAN OF CARE
Received patient this afternoon. Family at bedside as well as [address] [place] [address]. [address] [place] [address] placed a left femoral TLC/AL. Medications and blood draws per [address] [place] [address] request. Updated family at bedside. Will continue to monitor patient and give emotional support to family.

## 2022-05-14 NOTE — PROGRESS NOTES
05/14/22 1225   Clinical Encounter Type   Visited With Patient; Family   Continue Visiting Yes   Patient Spiritual Encounters   Spiritual Assessment Completed Yes   Family Spiritual Encounters   Family Coping Fearful; Sadness   Taxonomy   Intended Effects Helping someone feel comforted   Methods Explore nature of God;Offer support   Interventions Active listening; Acknowledge response to difficult experience;[place]  (Patient request last rites from . Father Marylin Lao offered prayer for patient and family)      available at pager [phone]

## 2022-05-14 NOTE — VASCULAR ACCESS
Came to bedside to assess pt and to place PICC line. Spoke to [employer], per RN pt is being assessed by Jerson Mar for Brain death, if confirmed Brain death pt wooten not need PICC line. KYLE and Alejandrina Holloway wants to hold off PICC line for now. KYLE Dolan will notify Vascular access Team if line is needed.

## 2022-05-15 ENCOUNTER — APPOINTMENT (OUTPATIENT)
Dept: GENERAL RADIOLOGY | Facility: HOSPITAL | Age: 67
End: 2022-05-15
Payer: COMMERCIAL

## 2022-05-15 ENCOUNTER — ANESTHESIA EVENT (OUTPATIENT)
Dept: SURGERY | Facility: HOSPITAL | Age: 67
End: 2022-05-15
Payer: COMMERCIAL

## 2022-05-15 ENCOUNTER — ANESTHESIA (OUTPATIENT)
Dept: SURGERY | Facility: HOSPITAL | Age: 67
End: 2022-05-15
Payer: COMMERCIAL

## 2022-05-15 ENCOUNTER — APPOINTMENT (OUTPATIENT)
Dept: CT IMAGING | Facility: HOSPITAL | Age: 67
End: 2022-05-15
Payer: COMMERCIAL

## 2022-05-15 ENCOUNTER — APPOINTMENT (OUTPATIENT)
Dept: GENERAL RADIOLOGY | Facility: HOSPITAL | Age: 67
End: 2022-05-15
Attending: HOSPITALIST
Payer: COMMERCIAL

## 2022-05-15 RX ORDER — HYDRALAZINE HYDROCHLORIDE 20 MG/ML
INJECTION INTRAMUSCULAR; INTRAVENOUS AS NEEDED
Status: DISCONTINUED | OUTPATIENT
Start: 2022-05-15 | End: 2022-05-16 | Stop reason: SURG

## 2022-05-15 RX ORDER — SODIUM CHLORIDE 9 MG/ML
INJECTION, SOLUTION INTRAVENOUS CONTINUOUS PRN
Status: DISCONTINUED | OUTPATIENT
Start: 2022-05-15 | End: 2022-05-16 | Stop reason: SURG

## 2022-05-15 RX ORDER — ROCURONIUM BROMIDE 10 MG/ML
INJECTION, SOLUTION INTRAVENOUS AS NEEDED
Status: DISCONTINUED | OUTPATIENT
Start: 2022-05-15 | End: 2022-05-16 | Stop reason: SURG

## 2022-05-15 RX ADMIN — SODIUM CHLORIDE: 9 INJECTION, SOLUTION INTRAVENOUS at 23:44:00

## 2022-05-15 RX ADMIN — SODIUM CHLORIDE: 9 INJECTION, SOLUTION INTRAVENOUS at 23:59:00

## 2022-05-15 RX ADMIN — ROCURONIUM BROMIDE 20 MG: 10 INJECTION, SOLUTION INTRAVENOUS at 22:42:00

## 2022-05-15 RX ADMIN — ROCURONIUM BROMIDE 10 MG: 10 INJECTION, SOLUTION INTRAVENOUS at 23:43:00

## 2022-05-15 RX ADMIN — HYDRALAZINE HYDROCHLORIDE 10 MG: 20 INJECTION INTRAMUSCULAR; INTRAVENOUS at 22:52:00

## 2022-05-15 RX ADMIN — SODIUM CHLORIDE: 9 INJECTION, SOLUTION INTRAVENOUS at 22:40:00

## 2022-05-15 RX ADMIN — SODIUM CHLORIDE: 9 INJECTION, SOLUTION INTRAVENOUS at 23:03:00

## 2022-05-15 NOTE — PROGRESS NOTES
05/15/22 1416   Clinical Encounter Type   Visited With Patient and family together;Health care provider  (wife, family, and friends at bedside)   Routine Visit Follow-up   Continue Visiting Yes   Patient's Supportive Strategies/Resources Per wife, family and brenton   Taxonomy   Intended Effects Demonstrate caring and concern   Methods Collaborate with care team member;Offer support   Interventions Acknowledge current situation; Active listening; Ask guided questions; Ask guided questions about brenton; Explain  role; Identify supportive relationship(s)   Journeyed with wife and others at bedside during this time of grief. Wife explained what physician stated about patient being brain dead. However, wife and friend explained that they are not losing hope and God can do anything.  explained that medicine/nursing/[facility] can answer questions about patient's condition. [facility] personnel also present at end of visit. RN informed about above. Spiritual care remains available via consult or at pager [phone].

## 2022-05-15 NOTE — PLAN OF CARE
Assumed care of patient along with [address] [place] [address] at 0730. Medications, labs, blood products as per [address] [place] [address] order. Family at bedside. Provided emotional support and comfort.

## 2022-05-16 LAB — BLOOD TYPE BARCODE: 7300

## 2022-05-16 RX ORDER — HEPARIN SODIUM 5000 [USP'U]/ML
INJECTION, SOLUTION INTRAVENOUS; SUBCUTANEOUS AS NEEDED
Status: DISCONTINUED | OUTPATIENT
Start: 2022-05-16 | End: 2022-05-16 | Stop reason: SURG

## 2022-05-16 RX ORDER — PHENYLEPHRINE HCL 10 MG/ML
VIAL (ML) INJECTION AS NEEDED
Status: DISCONTINUED | OUTPATIENT
Start: 2022-05-16 | End: 2022-05-16 | Stop reason: SURG

## 2022-05-16 RX ADMIN — SODIUM CHLORIDE: 9 INJECTION, SOLUTION INTRAVENOUS at 00:24:00

## 2022-05-16 RX ADMIN — PHENYLEPHRINE HCL 100 MCG: 10 MG/ML VIAL (ML) INJECTION at 00:22:00

## 2022-05-16 RX ADMIN — HEPARIN SODIUM 25500 UNITS: 5000 INJECTION, SOLUTION INTRAVENOUS; SUBCUTANEOUS at 00:20:00

## 2022-05-16 NOTE — PLAN OF CARE
Assumed patient care with [address] [place] [address] at [address]. K phos and dextrose bolus given per BRITNI. Hydralazine given for rising SBP. FWF Q1 continuing through OG. Malone draining clear yellow urine. Family at bedside. Emotional support and comfort given. 2215: Pt accompanied to elevators en route to OR with [address] [place] [address] nurse, RT, staff.

## 2022-05-16 NOTE — ANESTHESIA POSTPROCEDURE EVALUATION
[address] 58 Patient Status:  Inpatient   Age/Gender 78year old male MRN [id number]   [facility] SURGERY Attending Ethel Long MD   [facility] Day # 3 PCP Rena Ritter MD       Anesthesia Post-op Note    ORGAN DONATION [facility] organs - Liver, Lungs & Kidneys)    Procedure Summary     Date: 05/15/22 Room / Location: [address] [place] [address] OR 08 / [address] [facility] OR    Anesthesia Start: 3260 Anesthesia Stop: 05/16/22 0030    Procedure: ORGAN DONATION ( [facility] organs - Liver, Lungs & Kidneys) (N/A Abdomen) Diagnosis:       Neurological dysfunction      (Neurological dysfunction [R29.90])    Surgeons: Mackenzie, Raquel Of Anesthesiologist: Nancy Acevedo MD    Anesthesia Type: general ASA Status: 6          Anesthesia Type: general    Anesthesia Postop Note  Patient care handed off to [place] after cross clamp.

## (undated) DEVICE — SUT SILK 2-0 A305H

## (undated) DEVICE — CLIP INTERNAL HORIZON LG HEART

## (undated) DEVICE — LAPAROTOMY CDS: Brand: MEDLINE INDUSTRIES, INC.

## (undated) DEVICE — SUT PDS II 1 TP-1 Z880G

## (undated) DEVICE — TAPE UMBILICAL 1/8" COTTON

## (undated) DEVICE — SLEEVE KENDALL SCD EXPRESS MED

## (undated) DEVICE — MEGADYNE ELECTRODE ADULT PT RT

## (undated) DEVICE — LAPAROTOMY SPONGE - RF AND X-RAY DETECTABLE PRE-WASHED: Brand: SITUATE

## (undated) DEVICE — HEMOCLIP HORIZON MED 002200

## (undated) DEVICE — PROXIMATE SKIN STAPLERS (35 WIDE) CONTAINS 35 STAINLESS STEEL STAPLES (FIXED HEAD): Brand: PROXIMATE

## (undated) DEVICE — INTENDED TO BE USED TO OCCLUDE, RETRACT AND IDENTIFY ARTERIES, VEINS, TENDONS AND NERVES IN SURGICAL PROCEDURES: Brand: STERION®  VESSEL LOOP

## (undated) DEVICE — CAUTERY TIP BLADE EXTENSION

## (undated) DEVICE — SUT SILK 3-0 A184H

## (undated) DEVICE — SUT SILK 3-0 A304H

## (undated) DEVICE — LIGHT HANDLE

## (undated) DEVICE — ABSORBABLE HEMOSTAT (OXIDIZED REGENERATED CELLULOSE, U.S.P.): Brand: SURGICEL

## (undated) DEVICE — STERNUM SAW BLADE

## (undated) DEVICE — SUT ETHILON 2-0 LR 490T

## (undated) DEVICE — MEDI-VAC SUCTION HANDLE REGULAR CAPACITY: Brand: CARDINAL HEALTH

## (undated) DEVICE — SUT SILK 0 A306H

## (undated) DEVICE — MEDI-VAC NON-CONDUCTIVE SUCTION TUBING: Brand: CARDINAL HEALTH

## (undated) DEVICE — SUT SILK 4-0 A183H

## (undated) DEVICE — SUT SILK 2-0 SH K833H

## (undated) DEVICE — ELECTRODE EDGE PENCIL 10FT

## (undated) DEVICE — MONOPTY® DISPOSABLE CORE BIOPSY INSTRUMENT, 22MM PENETRATION DEPTH, 14G X 16CM: Brand: MONOPTY

## (undated) DEVICE — SUT SILK 3-0 SH K832H

## (undated) DEVICE — SOLUTION  .9 1000ML BTL